# Patient Record
Sex: FEMALE | Race: WHITE | NOT HISPANIC OR LATINO
[De-identification: names, ages, dates, MRNs, and addresses within clinical notes are randomized per-mention and may not be internally consistent; named-entity substitution may affect disease eponyms.]

---

## 2017-02-22 ENCOUNTER — RX RENEWAL (OUTPATIENT)
Age: 67
End: 2017-02-22

## 2017-02-27 ENCOUNTER — APPOINTMENT (OUTPATIENT)
Dept: OPHTHALMOLOGY | Facility: CLINIC | Age: 67
End: 2017-02-27

## 2017-03-01 ENCOUNTER — RESULT REVIEW (OUTPATIENT)
Age: 67
End: 2017-03-01

## 2017-03-01 NOTE — ASU PATIENT PROFILE, ADULT - PMH
Anxiety    Corneal abnormality  history of Fuch's dystrophy  Hypercholesteremia    Migraine headache
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
## 2017-03-01 NOTE — ASU PATIENT PROFILE, ADULT - HEALTHCARE QUESTIONS, PROFILE
Questions addressed prior to procedure with Dr. Pierre and anesthesiologist Questions addressed prior to procedure with Dr. tom and anesthesiologist

## 2017-03-02 ENCOUNTER — OUTPATIENT (OUTPATIENT)
Dept: OUTPATIENT SERVICES | Facility: HOSPITAL | Age: 67
LOS: 1 days | End: 2017-03-02
Payer: COMMERCIAL

## 2017-03-02 ENCOUNTER — APPOINTMENT (OUTPATIENT)
Dept: OPHTHALMOLOGY | Facility: HOSPITAL | Age: 67
End: 2017-03-02

## 2017-03-02 ENCOUNTER — TRANSCRIPTION ENCOUNTER (OUTPATIENT)
Age: 67
End: 2017-03-02

## 2017-03-02 VITALS
DIASTOLIC BLOOD PRESSURE: 62 MMHG | SYSTOLIC BLOOD PRESSURE: 108 MMHG | RESPIRATION RATE: 19 BRPM | OXYGEN SATURATION: 10 % | HEART RATE: 69 BPM

## 2017-03-02 VITALS
OXYGEN SATURATION: 96 % | WEIGHT: 195.33 LBS | RESPIRATION RATE: 27 BRPM | DIASTOLIC BLOOD PRESSURE: 72 MMHG | HEIGHT: 62 IN | TEMPERATURE: 98 F | HEART RATE: 76 BPM | SYSTOLIC BLOOD PRESSURE: 126 MMHG

## 2017-03-02 DIAGNOSIS — Z84.89 FAMILY HISTORY OF OTHER SPECIFIED CONDITIONS: Chronic | ICD-10-CM

## 2017-03-02 DIAGNOSIS — Z98.890 OTHER SPECIFIED POSTPROCEDURAL STATES: Chronic | ICD-10-CM

## 2017-03-02 DIAGNOSIS — H25.812 COMBINED FORMS OF AGE-RELATED CATARACT, LEFT EYE: ICD-10-CM

## 2017-03-02 DIAGNOSIS — Z98.89 OTHER SPECIFIED POSTPROCEDURAL STATES: Chronic | ICD-10-CM

## 2017-03-02 DIAGNOSIS — H18.12 BULLOUS KERATOPATHY, LEFT EYE: ICD-10-CM

## 2017-03-02 DIAGNOSIS — S02.2XXA FRACTURE OF NASAL BONES, INITIAL ENCOUNTER FOR CLOSED FRACTURE: Chronic | ICD-10-CM

## 2017-03-02 PROCEDURE — 65757 PREP CORNEAL ENDO ALLOGRAFT: CPT | Mod: LT

## 2017-03-02 PROCEDURE — 88312 SPECIAL STAINS GROUP 1: CPT | Mod: 26

## 2017-03-02 PROCEDURE — 88304 TISSUE EXAM BY PATHOLOGIST: CPT

## 2017-03-02 PROCEDURE — 87070 CULTURE OTHR SPECIMN AEROBIC: CPT

## 2017-03-02 PROCEDURE — 66984 XCAPSL CTRC RMVL W/O ECP: CPT | Mod: LT

## 2017-03-02 PROCEDURE — 88312 SPECIAL STAINS GROUP 1: CPT

## 2017-03-02 PROCEDURE — 88304 TISSUE EXAM BY PATHOLOGIST: CPT | Mod: 26

## 2017-03-02 PROCEDURE — C1780: CPT

## 2017-03-02 PROCEDURE — 65756 CORNEAL TRNSPL ENDOTHELIAL: CPT | Mod: LT

## 2017-03-02 PROCEDURE — V2785: CPT

## 2017-03-02 NOTE — ASU DISCHARGE PLAN (ADULT/PEDIATRIC). - NOTIFY
Swelling that continues/Bleeding that does not stop/Persistent Nausea and Vomiting/Fever greater than 101/Pain not relieved by Medications

## 2017-03-03 ENCOUNTER — APPOINTMENT (OUTPATIENT)
Dept: OPHTHALMOLOGY | Facility: CLINIC | Age: 67
End: 2017-03-03

## 2017-03-03 LAB — GRAM STN FLD: SIGNIFICANT CHANGE UP

## 2017-03-03 RX ORDER — CYCLOPENTOLATE HYDROCHLORIDE 10 MG/ML
1 SOLUTION/ DROPS OPHTHALMIC 3 TIMES DAILY
Qty: 1 | Refills: 2 | Status: ACTIVE | COMMUNITY
Start: 2017-03-03 | End: 1900-01-01

## 2017-03-06 ENCOUNTER — APPOINTMENT (OUTPATIENT)
Dept: OPHTHALMOLOGY | Facility: CLINIC | Age: 67
End: 2017-03-06

## 2017-03-13 ENCOUNTER — APPOINTMENT (OUTPATIENT)
Dept: OPHTHALMOLOGY | Facility: CLINIC | Age: 67
End: 2017-03-13

## 2017-03-21 ENCOUNTER — APPOINTMENT (OUTPATIENT)
Dept: OPHTHALMOLOGY | Facility: CLINIC | Age: 67
End: 2017-03-21

## 2017-03-23 LAB
CULTURE RESULTS: SIGNIFICANT CHANGE UP
SPECIMEN SOURCE: SIGNIFICANT CHANGE UP

## 2017-03-27 ENCOUNTER — APPOINTMENT (OUTPATIENT)
Dept: OPHTHALMOLOGY | Facility: CLINIC | Age: 67
End: 2017-03-27

## 2017-03-29 ENCOUNTER — RX RENEWAL (OUTPATIENT)
Age: 67
End: 2017-03-29

## 2017-04-03 ENCOUNTER — RX RENEWAL (OUTPATIENT)
Age: 67
End: 2017-04-03

## 2017-05-03 ENCOUNTER — APPOINTMENT (OUTPATIENT)
Dept: OPHTHALMOLOGY | Facility: CLINIC | Age: 67
End: 2017-05-03

## 2017-05-08 ENCOUNTER — APPOINTMENT (OUTPATIENT)
Dept: OPHTHALMOLOGY | Facility: CLINIC | Age: 67
End: 2017-05-08

## 2017-05-24 ENCOUNTER — APPOINTMENT (OUTPATIENT)
Dept: OPHTHALMOLOGY | Facility: CLINIC | Age: 67
End: 2017-05-24

## 2017-05-24 DIAGNOSIS — H26.9 UNSPECIFIED CATARACT: ICD-10-CM

## 2017-06-21 ENCOUNTER — APPOINTMENT (OUTPATIENT)
Dept: OPHTHALMOLOGY | Facility: CLINIC | Age: 67
End: 2017-06-21

## 2017-07-18 ENCOUNTER — APPOINTMENT (OUTPATIENT)
Dept: OPHTHALMOLOGY | Facility: CLINIC | Age: 67
End: 2017-07-18

## 2017-07-18 DIAGNOSIS — Z96.1 PRESENCE OF INTRAOCULAR LENS: ICD-10-CM

## 2017-07-19 ENCOUNTER — APPOINTMENT (OUTPATIENT)
Dept: OPHTHALMOLOGY | Facility: CLINIC | Age: 67
End: 2017-07-19

## 2017-09-20 ENCOUNTER — APPOINTMENT (OUTPATIENT)
Dept: OPHTHALMOLOGY | Facility: CLINIC | Age: 67
End: 2017-09-20
Payer: MEDICARE

## 2017-09-20 PROCEDURE — 92012 INTRM OPH EXAM EST PATIENT: CPT

## 2017-09-21 ENCOUNTER — TRANSCRIPTION ENCOUNTER (OUTPATIENT)
Age: 67
End: 2017-09-21

## 2017-11-21 ENCOUNTER — APPOINTMENT (OUTPATIENT)
Dept: OPHTHALMOLOGY | Facility: CLINIC | Age: 67
End: 2017-11-21

## 2018-01-05 ENCOUNTER — APPOINTMENT (OUTPATIENT)
Dept: OPHTHALMOLOGY | Facility: CLINIC | Age: 68
End: 2018-01-05

## 2018-02-26 ENCOUNTER — APPOINTMENT (OUTPATIENT)
Dept: OPHTHALMOLOGY | Facility: CLINIC | Age: 68
End: 2018-02-26

## 2018-03-26 ENCOUNTER — APPOINTMENT (OUTPATIENT)
Dept: OPHTHALMOLOGY | Facility: CLINIC | Age: 68
End: 2018-03-26
Payer: MEDICARE

## 2018-03-26 DIAGNOSIS — H53.10 UNSPECIFIED SUBJECTIVE VISUAL DISTURBANCES: ICD-10-CM

## 2018-03-26 DIAGNOSIS — H26.491 OTHER SECONDARY CATARACT, RIGHT EYE: ICD-10-CM

## 2018-03-26 PROCEDURE — 92014 COMPRE OPH EXAM EST PT 1/>: CPT

## 2018-03-26 PROCEDURE — 92134 CPTRZ OPH DX IMG PST SGM RTA: CPT

## 2018-04-16 ENCOUNTER — APPOINTMENT (OUTPATIENT)
Dept: OPHTHALMOLOGY | Facility: CLINIC | Age: 68
End: 2018-04-16
Payer: MEDICARE

## 2018-04-16 DIAGNOSIS — G43.909 MIGRAINE, UNSPECIFIED, NOT INTRACTABLE, W/OUT STATUS MIGRAINOSUS: ICD-10-CM

## 2018-04-16 DIAGNOSIS — Z78.9 OTHER SPECIFIED HEALTH STATUS: ICD-10-CM

## 2018-04-16 PROCEDURE — 92083 EXTENDED VISUAL FIELD XM: CPT

## 2018-04-16 PROCEDURE — 92012 INTRM OPH EXAM EST PATIENT: CPT

## 2018-09-14 ENCOUNTER — APPOINTMENT (OUTPATIENT)
Dept: OPHTHALMOLOGY | Facility: CLINIC | Age: 68
End: 2018-09-14
Payer: MEDICARE

## 2018-09-14 PROCEDURE — 92012 INTRM OPH EXAM EST PATIENT: CPT

## 2018-09-19 ENCOUNTER — APPOINTMENT (OUTPATIENT)
Dept: OPHTHALMOLOGY | Facility: CLINIC | Age: 68
End: 2018-09-19

## 2019-03-03 ENCOUNTER — TRANSCRIPTION ENCOUNTER (OUTPATIENT)
Age: 69
End: 2019-03-03

## 2019-03-06 ENCOUNTER — APPOINTMENT (OUTPATIENT)
Dept: OPHTHALMOLOGY | Facility: CLINIC | Age: 69
End: 2019-03-06
Payer: MEDICARE

## 2019-03-06 DIAGNOSIS — H18.51 ENDOTHELIAL CORNEAL DYSTROPHY: ICD-10-CM

## 2019-03-06 DIAGNOSIS — H43.812 VITREOUS DEGENERATION, LEFT EYE: ICD-10-CM

## 2019-03-06 DIAGNOSIS — Z94.7 CORNEAL TRANSPLANT STATUS: ICD-10-CM

## 2019-03-06 PROCEDURE — 92014 COMPRE OPH EXAM EST PT 1/>: CPT

## 2019-10-28 ENCOUNTER — NON-APPOINTMENT (OUTPATIENT)
Age: 69
End: 2019-10-28

## 2019-10-28 ENCOUNTER — APPOINTMENT (OUTPATIENT)
Dept: OPHTHALMOLOGY | Facility: CLINIC | Age: 69
End: 2019-10-28
Payer: MEDICARE

## 2019-10-28 PROCEDURE — 92012 INTRM OPH EXAM EST PATIENT: CPT

## 2020-04-29 ENCOUNTER — APPOINTMENT (OUTPATIENT)
Dept: OPHTHALMOLOGY | Facility: CLINIC | Age: 70
End: 2020-04-29

## 2021-04-01 ENCOUNTER — NON-APPOINTMENT (OUTPATIENT)
Age: 71
End: 2021-04-01

## 2021-04-21 ENCOUNTER — APPOINTMENT (OUTPATIENT)
Dept: OPHTHALMOLOGY | Facility: CLINIC | Age: 71
End: 2021-04-21
Payer: MEDICARE

## 2021-04-21 ENCOUNTER — NON-APPOINTMENT (OUTPATIENT)
Age: 71
End: 2021-04-21

## 2021-04-21 PROCEDURE — 92014 COMPRE OPH EXAM EST PT 1/>: CPT

## 2021-10-25 ENCOUNTER — APPOINTMENT (OUTPATIENT)
Dept: OPHTHALMOLOGY | Facility: CLINIC | Age: 71
End: 2021-10-25
Payer: MEDICARE

## 2021-10-25 ENCOUNTER — NON-APPOINTMENT (OUTPATIENT)
Age: 71
End: 2021-10-25

## 2021-10-25 PROCEDURE — 92012 INTRM OPH EXAM EST PATIENT: CPT

## 2021-12-28 ENCOUNTER — NON-APPOINTMENT (OUTPATIENT)
Age: 71
End: 2021-12-28

## 2022-01-19 NOTE — ASU PATIENT PROFILE, ADULT - PT NEEDS ASSIST
Denis Anne (:  1952) is a 78 y.o. female,Established patient, here for evaluation of the following chief complaint(s):  Hip Pain (left going down the left leg)        Subjective   SUBJECTIVE/OBJECTIVE:  HPI:    Chief Complaint   Patient presents with    Hip Pain     left going down the left leg     Pt here for ongoing left hip pain with radiation to the left leg. Will travel all the way down into the foot. Some weakness noted. Pain also located in the left buttock. Worse in the am, improves in a few minutes to hours after waking up. Some numbness noted to lateral thigh. No recent trauma. Patient Active Problem List   Diagnosis    CAD (coronary artery disease) 2014- mid lad 50 % patent LM< LCX and RCA    Hyperlipidemia LDL goal <70    Hypothyroid    History of total thyroidectomy    GERD (gastroesophageal reflux disease)    PUD (peptic ulcer disease)    IBS (irritable bowel syndrome)    IFG (impaired fasting glucose)    Vitamin D deficiency    SOB (shortness of breath) on exertion    Chest pain    Heart palpitations- intermittent    Abnormal nuclear stress test- anteroior and anteroseptal ischemia    Chest pain, atypical    Abdominal pain    Stress incontinence (female) (male)    Abnormal cardiovascular stress test    GOLDEN (dyspnea on exertion)     Past Surgical History:   Procedure Laterality Date    CARDIAC CATHETERIZATION      CARPAL TUNNEL RELEASE      CHOLECYSTECTOMY      COLONOSCOPY      HAMMER TOE SURGERY Left     HEEL SPUR SURGERY      HYSTERECTOMY      KNEE SURGERY  2009    TOTAL THYROIDECTOMY      UPPER GASTROINTESTINAL ENDOSCOPY  2009     Social History     Tobacco Use    Smoking status: Never Smoker    Smokeless tobacco: Never Used   Vaping Use    Vaping Use: Never used   Substance Use Topics    Alcohol use: No    Drug use: No         Review of Systems   Constitutional: Negative. HENT: Negative. Respiratory: Negative. Cardiovascular: Negative. Gastrointestinal: Negative. Musculoskeletal: Positive for arthralgias, back pain and gait problem. Neurological: Positive for numbness. Negative for weakness. All other systems reviewed and are negative. Objective   Physical Exam  Vitals and nursing note reviewed. Constitutional:       General: She is not in acute distress. Appearance: Normal appearance. She is well-developed. HENT:      Head: Normocephalic and atraumatic. Right Ear: Tympanic membrane normal.      Left Ear: Tympanic membrane normal.   Eyes:      Conjunctiva/sclera: Conjunctivae normal.   Cardiovascular:      Rate and Rhythm: Normal rate and regular rhythm. Heart sounds: Normal heart sounds. No murmur heard. Pulmonary:      Effort: Pulmonary effort is normal.      Breath sounds: Normal breath sounds. No wheezing, rhonchi or rales. Abdominal:      General: There is no distension. Musculoskeletal:      Cervical back: Neck supple. Lumbar back: Tenderness present. Decreased range of motion. Back:       Left hip: Tenderness present. No bony tenderness. Decreased range of motion. Skin:     General: Skin is warm and dry. Findings: No rash (on exposed surfaces). Neurological:      General: No focal deficit present. Mental Status: She is alert. Psychiatric:         Attention and Perception: Attention normal.         Mood and Affect: Mood normal.         Speech: Speech normal.         Behavior: Behavior normal. Behavior is cooperative. Thought Content: Thought content normal.         Judgment: Judgment normal.               ASSESSMENT/PLAN:  1. Trochanteric bursitis of left hip  2.  Left lumbar radiculitis  -     methylPREDNISolone acetate (DEPO-MEDROL) injection 80 mg; 80 mg, IntraMUSCular, ONCE, On Wed 1/19/22 at 1130, For 1 dose  -     methylPREDNISolone acetate (DEPO-MEDROL) injection 40 mg; 40 mg, IntraMUSCular, ONCE, On Wed 1/19/22 at 1130, For 1 dose  -     External Referral To Physical Therapy  3. DDD (degenerative disc disease), lumbar  -     External Referral To Physical Therapy  -     XR LUMBAR SPINE (2-3 VIEWS); Future  4. Chronic left hip pain  -     External Referral To Physical Therapy  -     XR HIP LEFT (2-3 VIEWS); Future  5. Piriformis muscle pain  -     External Referral To Physical Therapy    -  Orders above    Return if symptoms worsen or fail to improve. An electronic signature was used to authenticate this note.     --Jolie Worrell, DO no

## 2022-02-17 ENCOUNTER — APPOINTMENT (OUTPATIENT)
Dept: ENDOCRINOLOGY | Facility: CLINIC | Age: 72
End: 2022-02-17
Payer: MEDICARE

## 2022-02-17 VITALS
HEIGHT: 63 IN | OXYGEN SATURATION: 91 % | BODY MASS INDEX: 40.57 KG/M2 | WEIGHT: 229 LBS | DIASTOLIC BLOOD PRESSURE: 82 MMHG | HEART RATE: 87 BPM | SYSTOLIC BLOOD PRESSURE: 140 MMHG

## 2022-02-17 DIAGNOSIS — Z78.9 OTHER SPECIFIED HEALTH STATUS: ICD-10-CM

## 2022-02-17 PROCEDURE — 99204 OFFICE O/P NEW MOD 45 MIN: CPT

## 2022-02-19 NOTE — PHYSICAL EXAM
[Alert] : alert [Obese] : obese [No Acute Distress] : no acute distress [Well Developed] : well developed [Normal Sclera/Conjunctiva] : normal sclera/conjunctiva [EOMI] : extra ocular movement intact [No Proptosis] : no proptosis [No Lid Lag] : no lid lag [Normal Hearing] : hearing was normal [No LAD] : no lymphadenopathy [Supple] : the neck was supple [Thyroid Not Enlarged] : the thyroid was not enlarged [No Respiratory Distress] : no respiratory distress [Normal Rate and Effort] : normal respiratory rate and effort [No Accessory Muscle Use] : no accessory muscle use [Clear to Auscultation] : lungs were clear to auscultation bilaterally [Normal S1, S2] : normal S1 and S2 [No Murmurs] : no murmurs [Normal Rate] : heart rate was normal [Regular Rhythm] : with a regular rhythm [No Edema] : no peripheral edema [Normal Bowel Sounds] : normal bowel sounds [Not Tender] : non-tender [Soft] : abdomen soft [No Stigmata of Cushings Syndrome] : no stigmata of Cushings Syndrome [Normal Gait] : normal gait [No Clubbing, Cyanosis] : no clubbing  or cyanosis of the fingernails [No Involuntary Movements] : no involuntary movements were seen [No Rash] : no rash [Normal Reflexes] : deep tendon reflexes were 2+ and symmetric [No Tremors] : no tremors [Oriented x3] : oriented to person, place, and time [Normal Insight/Judgement] : insight and judgment were intact [Abdominal Striae] : no abdominal striae [Acanthosis Nigricans] : no acanthosis nigricans [Hirsutism] : no hirsutism

## 2022-02-19 NOTE — CONSULT LETTER
[Dear  ___] : Dear  [unfilled], [Consult Letter:] : I had the pleasure of evaluating your patient, [unfilled]. [Consult Closing:] : Thank you very much for allowing me to participate in the care of this patient.  If you have any questions, please do not hesitate to contact me. [Please see my note below.] : Please see my note below. [Sincerely,] : Sincerely, [FreeTextEntry3] : Morelia Best, DO\par Endocrinologist \par [facility]\par

## 2022-02-19 NOTE — ASSESSMENT
[FreeTextEntry1] : 1. h/o multinodular goiter\par h/o previous FNA of thyroid nodule in 2003 and 2006, previous pathology reportedly benign, however no records to review today\par Repeat FNA of left midpole 3.9cm nodule in November 2021 noted report of pathology AUS ([place] category III), moelcular testing was "very low risk of malignancy".\par Explained clinical significance of this report, will opt to monitor thyroid nodule closely and consider repeat FNA with repeat sonogram in 6 months.\par Advised patient to obtain reports of previous FNA pathology and send to this office.\par Clinically euthyroid, not on thyroid medication currently. Previous TFTs noted within normal range.\par Will repeat TFTs at next visit\par \par Answered all questions today; patient verbalized understanding of the above.\par RTC in 3 months.

## 2022-02-19 NOTE — HISTORY OF PRESENT ILLNESS
[FreeTextEntry1] : MINISTERIO ALARCON  is a 70 yo female with past medical history of multinodular goiter, Fuchs corneal dystrophy, anxiety and depression who presents to clinic today for management of thyroid nodule.\par \par She has history of multinodular goiter, she notes she had 2 FNAs previously  in 2003, 2006 of reportedly the same thyroid nodule and pathology was benign, had noted repeat FNA in Nov 2021 due to change in size of thyroid nodule.She is not currently on thyroid medication.  \par \par Patient denies heat or cold intolerance, dyspnea, dysphagia, dysphonia, changes in bowel habits including diarrhea or constipation or any recent change in weight.  Menopausal after hysterectomy in 2014.  She also denies anxiety, palpitations, tremors/shaking of extremities or insomnia.\par \par PMH:as above\par PSH: b/l corneal transplant, L knee ACL, right knee meniscus, hysterectomy\par Family Hx: No h/o thyroid disease, Father- DM \par Social Hx: social ETOH, tobacco or illicit drug\par ALL: NKDA\par Home Meds: vitamin D2 50,000IU po weekly, Cetirizine 10mg daily, Ezetimibe 10mg daily, Atorvastatin 80mg daily, Sertraline 200mg daily, Bupropion HCl 150mg daily, Lorazepam 0.5mg as needed, Mirtazapine 15mg daily

## 2022-04-29 ENCOUNTER — APPOINTMENT (OUTPATIENT)
Dept: OPHTHALMOLOGY | Facility: CLINIC | Age: 72
End: 2022-04-29
Payer: MEDICARE

## 2022-04-29 ENCOUNTER — NON-APPOINTMENT (OUTPATIENT)
Age: 72
End: 2022-04-29

## 2022-04-29 PROCEDURE — 92014 COMPRE OPH EXAM EST PT 1/>: CPT

## 2022-07-30 ENCOUNTER — NON-APPOINTMENT (OUTPATIENT)
Age: 72
End: 2022-07-30

## 2022-08-04 ENCOUNTER — APPOINTMENT (OUTPATIENT)
Dept: ENDOCRINOLOGY | Facility: CLINIC | Age: 72
End: 2022-08-04

## 2022-08-04 VITALS
BODY MASS INDEX: 41.11 KG/M2 | DIASTOLIC BLOOD PRESSURE: 80 MMHG | WEIGHT: 232 LBS | HEART RATE: 77 BPM | HEIGHT: 63 IN | OXYGEN SATURATION: 90 % | SYSTOLIC BLOOD PRESSURE: 130 MMHG

## 2022-08-04 PROCEDURE — 99213 OFFICE O/P EST LOW 20 MIN: CPT

## 2022-08-04 NOTE — PHYSICAL EXAM
[Alert] : alert [No Acute Distress] : no acute distress [Well Developed] : well developed [Normal Sclera/Conjunctiva] : normal sclera/conjunctiva [EOMI] : extra ocular movement intact [No Proptosis] : no proptosis [No Lid Lag] : no lid lag [Normal Hearing] : hearing was normal [No LAD] : no lymphadenopathy [Supple] : the neck was supple [Thyroid Not Enlarged] : the thyroid was not enlarged [No Thyroid Nodules] : no palpable thyroid nodules [No Respiratory Distress] : no respiratory distress [No Accessory Muscle Use] : no accessory muscle use [Normal Rate and Effort] : normal respiratory rate and effort [Clear to Auscultation] : lungs were clear to auscultation bilaterally [Normal S1, S2] : normal S1 and S2 [No Murmurs] : no murmurs [Normal Rate] : heart rate was normal [Regular Rhythm] : with a regular rhythm [No Edema] : no peripheral edema [Normal Bowel Sounds] : normal bowel sounds [Not Tender] : non-tender [Not Distended] : not distended [Soft] : abdomen soft [No Stigmata of Cushings Syndrome] : no stigmata of Cushings Syndrome [Normal Gait] : normal gait [No Clubbing, Cyanosis] : no clubbing  or cyanosis of the fingernails [No Involuntary Movements] : no involuntary movements were seen [Normal Reflexes] : deep tendon reflexes were 2+ and symmetric [No Tremors] : no tremors [Oriented x3] : oriented to person, place, and time [Normal Insight/Judgement] : insight and judgment were intact

## 2022-08-09 NOTE — ASSESSMENT
[FreeTextEntry1] : 1. h/o multinodular goiter\par h/o previous FNA of thyroid nodule in 2003 and 2006, previous pathology reportedly benign, however no records to review \par Repeat FNA of left midpole 3.9cm nodule in November 2021 noted report of pathology AUS ([place] category III), molecular testing noted "very low risk of malignancy".\par Patient opted to repeat sonogram however forget to to do today.\par Denies compressive symptoms today\par Advised patient to obtain reports of previous FNA pathology and send to this office.\par Clinically euthyroid, not on thyroid medication currently. Last TSH from July 2022 is 1.26, within normal range.\par \par \par Answered all questions today; patient verbalized understanding of the above\par RTC once thyroid sonogram completed

## 2022-08-09 NOTE — HISTORY OF PRESENT ILLNESS
[FreeTextEntry1] : This is a 72 yo female with past medical history of multinodular goiter, Fuchs corneal dystrophy, anxiety and depression who presents for follow up of thyroid nodule.\par \par She was seen at last endocrine visit in April 2022 at which time repeat FNA of left midpole nodule in Nov 2021 noted AUS, molecular testing noted "low risk of malignancy" and discussed to either to repeat FNA/thyroid sonogram. She also had 2 FNAs previously in 2003, 2006 of reportedly the same thyroid nodule and pathology was benign, had noted repeat FNA in Nov 2021 due to change in size of thyroid nodule.She is not currently on thyroid medication. \par Patient opted to repeat sonogram. She is not on thyroid medication\par \par Today she is doing well and has no complaints, however she notes she did not repeat thyroid sonogram. She notes she recently getting workup for breast lesion, she is currently scheduled for lumpectomy next week, she notes this was done as a "prophylactic surgery" by breast surgeon Dr Holland from [facility].

## 2022-10-24 ENCOUNTER — APPOINTMENT (OUTPATIENT)
Dept: OPHTHALMOLOGY | Facility: CLINIC | Age: 72
End: 2022-10-24

## 2022-11-25 ENCOUNTER — NON-APPOINTMENT (OUTPATIENT)
Age: 72
End: 2022-11-25

## 2022-11-25 ENCOUNTER — APPOINTMENT (OUTPATIENT)
Dept: OPHTHALMOLOGY | Facility: CLINIC | Age: 72
End: 2022-11-25

## 2022-11-25 PROCEDURE — 92014 COMPRE OPH EXAM EST PT 1/>: CPT | Mod: 25

## 2022-12-12 ENCOUNTER — NON-APPOINTMENT (OUTPATIENT)
Age: 72
End: 2022-12-12

## 2022-12-20 ENCOUNTER — APPOINTMENT (OUTPATIENT)
Dept: ENDOCRINOLOGY | Facility: CLINIC | Age: 72
End: 2022-12-20

## 2022-12-20 VITALS
WEIGHT: 240 LBS | BODY MASS INDEX: 42.52 KG/M2 | SYSTOLIC BLOOD PRESSURE: 130 MMHG | DIASTOLIC BLOOD PRESSURE: 78 MMHG | HEIGHT: 63 IN

## 2022-12-20 PROCEDURE — 36415 COLL VENOUS BLD VENIPUNCTURE: CPT

## 2022-12-20 PROCEDURE — 99212 OFFICE O/P EST SF 10 MIN: CPT | Mod: 25

## 2022-12-27 ENCOUNTER — NON-APPOINTMENT (OUTPATIENT)
Age: 72
End: 2022-12-27

## 2022-12-27 LAB
T4 FREE SERPL-MCNC: 0.8 NG/DL
TSH SERPL-ACNC: 1.39 UIU/ML

## 2022-12-27 NOTE — HISTORY OF PRESENT ILLNESS
[FreeTextEntry1] : This is a 73 yo female with past medical history of multinodular goiter, Fuchs corneal dystrophy, anxiety and depression who presents for follow up of thyroid nodule.\par \par She was seen at last endocrine visit in April 2022 at which time repeat FNA of left midpole nodule in Nov 2021 noted AUS, molecular testing noted "low risk of malignancy" and discussed to either to repeat FNA/thyroid sonogram. She also had 2 FNAs previously in 2003, 2006 of reportedly the same thyroid nodule and pathology was benign, had noted repeat FNA in Nov 2021 due to change in size of thyroid nodule.She is not currently on thyroid medication. Patient opted to repeat thyroid sonogram. \par \par Today she is doing well and has no complaints, \par She is on Evista for 5 years for breast chemoprevention and osteoporosis. She is following with breast surgeon Dr Holland from [facility]. 
No

## 2022-12-27 NOTE — ASSESSMENT
[FreeTextEntry1] : 1. h/o multinodular goiter\par h/o previous FNA of thyroid nodule in 2003 and 2006, previous pathology reportedly benign, however no records to review \par Repeat FNA of left midpole 3.9cm nodule in November 2021 noted report of pathology AUS ([place] category III), molecular testing noted "very low risk of malignancy".\par Discussed thyroid sonogram from Dec 2022 with patient today, noted decrease in size of left midpole nodule, will continue to monitor. Patient denies compressive symptoms today\par Patient will repeat thyroid sonogram next year\par Advised patient to obtain reports of previous FNA pathology and send to this office.\par Clinically euthyroid, not on thyroid medication currently\par Bloodwork was collected in office today, will f/u results accordingly. \par \par Answered all questions today; patient verbalized understanding of the above\par RTCin 6 months

## 2023-01-07 ENCOUNTER — APPOINTMENT (OUTPATIENT)
Dept: ORTHOPEDIC SURGERY | Facility: CLINIC | Age: 73
End: 2023-01-07

## 2023-05-24 ENCOUNTER — APPOINTMENT (OUTPATIENT)
Dept: OPHTHALMOLOGY | Facility: CLINIC | Age: 73
End: 2023-05-24
Payer: MEDICARE

## 2023-05-24 ENCOUNTER — NON-APPOINTMENT (OUTPATIENT)
Age: 73
End: 2023-05-24

## 2023-05-24 PROCEDURE — 92012 INTRM OPH EXAM EST PATIENT: CPT

## 2023-05-26 ENCOUNTER — APPOINTMENT (OUTPATIENT)
Dept: ORTHOPEDIC SURGERY | Facility: CLINIC | Age: 73
End: 2023-05-26
Payer: MEDICARE

## 2023-05-26 VITALS — HEIGHT: 63 IN | BODY MASS INDEX: 42.52 KG/M2 | WEIGHT: 240 LBS

## 2023-05-26 DIAGNOSIS — M17.11 UNILATERAL PRIMARY OSTEOARTHRITIS, RIGHT KNEE: ICD-10-CM

## 2023-05-26 DIAGNOSIS — M17.12 UNILATERAL PRIMARY OSTEOARTHRITIS, LEFT KNEE: ICD-10-CM

## 2023-05-26 PROCEDURE — 20610 DRAIN/INJ JOINT/BURSA W/O US: CPT | Mod: 50

## 2023-05-26 PROCEDURE — J3490M: CUSTOM | Mod: NC

## 2023-05-26 PROCEDURE — 73564 X-RAY EXAM KNEE 4 OR MORE: CPT | Mod: 50

## 2023-05-26 PROCEDURE — 99213 OFFICE O/P EST LOW 20 MIN: CPT | Mod: 25

## 2023-05-26 RX ORDER — BUPRENORPHINE HCL 150 MCG
150 FILM, MEDICATED (EA) BUCCAL
Refills: 0 | Status: ACTIVE | COMMUNITY

## 2023-05-26 RX ORDER — RALOXIFENE HYDROCHLORIDE 60 MG/1
60 TABLET, FILM COATED ORAL
Refills: 0 | Status: ACTIVE | COMMUNITY

## 2023-05-26 RX ORDER — ATORVASTATIN CALCIUM 80 MG/1
80 TABLET, FILM COATED ORAL
Refills: 0 | Status: ACTIVE | COMMUNITY

## 2023-05-26 NOTE — DISCUSSION/SUMMARY
[de-identified] : 72f with b/l knee djd\par 1) csi b/l knees today - tolerated well\par 2) discussed availability of visco injections\par 3) cryotherapy, rest and activity modification\par 4) hep \par \par Entered by Keila Leggett acting as scribe.\par Dr. Hawley-\par The documentation recorded by the scribe accurately reflects the service I personally performed and the decisions made by me.

## 2023-05-26 NOTE — HISTORY OF PRESENT ILLNESS
[10] : 10 [4] : 4 [Dull/Aching] : dull/aching [Constant] : constant [Leisure] : leisure [Sleep] : sleep [Nothing helps with pain getting better] : Nothing helps with pain getting better [Standing] : standing [Walking] : walking [de-identified] : 05/26/2023 Ms. MINISTERIO ALARCON, a 72 year old female, presents today for b/l knees. Hx of right knee pmm/plm 2021, hx of orthovisc injections. Reports that she has been experiencing intermittent b/l knee pain, greatly aggravated after a recent trip to [place].  [] : no [FreeTextEntry1] :  B/L Knees [FreeTextEntry5] : Pt complaining of ongoing  B/L Knee pain for a couple months.  Walking with  limp. ROM normal.  B/L Knees No swelling but feels tender.

## 2023-05-26 NOTE — PROCEDURE
[FreeTextEntry3] : Large joint injection was performed of the b/l knees. An injection of Lidocaine 3cc of 1% , Bupivacaine (Marcaine) 6cc of 0.5% , Triamcinolone (Kenalog) 2cc of 40 mg  was used. \par Patient was advised to call if redness, pain or fever occur and apply ice for 15 minutes out of every hour for the next 12-24 hours as tolerated. \par \par Patient has tried OTC's including aspirin, Ibuprofen, Aleve, etc or prescription NSAIDS, and/or exercises at home and/or physical therapy without satisfactory response, patient had decreased mobility in the joint and the risks benefits, and alternatives have been discussed, and verbal consent was obtained. \par The site was prepped with alcohol, betadine and ethyl chloride sprayed topically\par \par The risks, benefits and contents of the injection have been discussed.  Risks include but are not limited to allergic reaction, flare reaction, permanent white skin discoloration at the injection site and infection.  The patient understands the risks and agrees to having the injection.  All questions have been answered.

## 2023-05-30 ENCOUNTER — APPOINTMENT (OUTPATIENT)
Dept: ENDOCRINOLOGY | Facility: CLINIC | Age: 73
End: 2023-05-30

## 2023-06-13 ENCOUNTER — APPOINTMENT (OUTPATIENT)
Dept: ENDOCRINOLOGY | Facility: CLINIC | Age: 73
End: 2023-06-13
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
## 2023-11-24 ENCOUNTER — APPOINTMENT (OUTPATIENT)
Dept: OPHTHALMOLOGY | Facility: CLINIC | Age: 73
End: 2023-11-24

## 2023-11-28 ENCOUNTER — APPOINTMENT (OUTPATIENT)
Dept: ENDOCRINOLOGY | Facility: CLINIC | Age: 73
End: 2023-11-28
Payer: MEDICARE

## 2023-11-28 VITALS
BODY MASS INDEX: 39.73 KG/M2 | HEIGHT: 63 IN | DIASTOLIC BLOOD PRESSURE: 77 MMHG | OXYGEN SATURATION: 97 % | HEART RATE: 82 BPM | SYSTOLIC BLOOD PRESSURE: 124 MMHG | WEIGHT: 224.25 LBS

## 2023-11-28 DIAGNOSIS — E04.1 NONTOXIC SINGLE THYROID NODULE: ICD-10-CM

## 2023-11-28 PROCEDURE — 99213 OFFICE O/P EST LOW 20 MIN: CPT | Mod: 25

## 2023-11-28 PROCEDURE — 36415 COLL VENOUS BLD VENIPUNCTURE: CPT

## 2023-12-01 LAB
T4 FREE SERPL-MCNC: 0.8 NG/DL
TSH SERPL-ACNC: 2.4 UIU/ML

## 2023-12-01 RX ORDER — LORAZEPAM 0.5 MG/1
0.5 TABLET ORAL
Qty: 60 | Refills: 0 | Status: ACTIVE | COMMUNITY
Start: 2023-11-16

## 2023-12-01 RX ORDER — NYSTATIN AND TRIAMCINOLONE ACETONIDE 100000; 1 MG/G; MG/G
100000-0.1 CREAM TOPICAL
Qty: 30 | Refills: 0 | Status: ACTIVE | COMMUNITY
Start: 2023-09-15

## 2023-12-01 RX ORDER — SERTRALINE HYDROCHLORIDE 100 MG/1
100 TABLET, FILM COATED ORAL
Qty: 180 | Refills: 0 | Status: ACTIVE | COMMUNITY
Start: 2023-11-16

## 2023-12-01 RX ORDER — BUPROPION HYDROCHLORIDE 150 MG/1
150 TABLET, EXTENDED RELEASE ORAL
Qty: 90 | Refills: 0 | Status: ACTIVE | COMMUNITY
Start: 2023-08-15

## 2023-12-01 RX ORDER — FLUOROMETHOLONE 1 MG/ML
0.1 SOLUTION/ DROPS OPHTHALMIC
Qty: 10 | Refills: 0 | Status: ACTIVE | COMMUNITY
Start: 2023-05-24

## 2023-12-01 RX ORDER — OMEPRAZOLE 40 MG/1
40 CAPSULE, DELAYED RELEASE ORAL
Qty: 30 | Refills: 0 | Status: ACTIVE | COMMUNITY
Start: 2023-06-13

## 2023-12-01 RX ORDER — QUETIAPINE FUMARATE 100 MG/1
100 TABLET ORAL
Qty: 90 | Refills: 0 | Status: ACTIVE | COMMUNITY
Start: 2023-11-16

## 2023-12-01 RX ORDER — ERGOCALCIFEROL 1.25 MG/1
1.25 MG CAPSULE, LIQUID FILLED ORAL
Qty: 12 | Refills: 0 | Status: ACTIVE | COMMUNITY
Start: 2023-07-26

## 2023-12-01 RX ORDER — QUETIAPINE FUMARATE 50 MG/1
50 TABLET ORAL
Qty: 90 | Refills: 0 | Status: ACTIVE | COMMUNITY
Start: 2023-11-16

## 2024-01-03 ENCOUNTER — NON-APPOINTMENT (OUTPATIENT)
Age: 74
End: 2024-01-03

## 2024-03-04 ENCOUNTER — APPOINTMENT (OUTPATIENT)
Dept: OPHTHALMOLOGY | Facility: CLINIC | Age: 74
End: 2024-03-04

## 2024-06-21 ENCOUNTER — APPOINTMENT (OUTPATIENT)
Dept: OPHTHALMOLOGY | Facility: CLINIC | Age: 74
End: 2024-06-21
Payer: MEDICARE

## 2024-06-21 ENCOUNTER — NON-APPOINTMENT (OUTPATIENT)
Age: 74
End: 2024-06-21

## 2024-06-21 PROCEDURE — 92014 COMPRE OPH EXAM EST PT 1/>: CPT

## 2024-10-10 ENCOUNTER — APPOINTMENT (OUTPATIENT)
Dept: ORTHOPEDIC SURGERY | Facility: CLINIC | Age: 74
End: 2024-10-10
Payer: MEDICARE

## 2024-10-10 VITALS — HEIGHT: 63 IN | WEIGHT: 224 LBS | BODY MASS INDEX: 39.69 KG/M2

## 2024-10-10 PROCEDURE — 73564 X-RAY EXAM KNEE 4 OR MORE: CPT | Mod: 50

## 2024-10-10 PROCEDURE — 99214 OFFICE O/P EST MOD 30 MIN: CPT

## 2024-10-10 RX ORDER — EZETIMIBE 10 MG/1
TABLET ORAL
Refills: 0 | Status: ACTIVE | COMMUNITY

## 2024-10-10 RX ORDER — TRAZODONE HYDROCHLORIDE 50 MG/1
50 TABLET ORAL
Refills: 0 | Status: ACTIVE | COMMUNITY

## 2024-10-18 ENCOUNTER — APPOINTMENT (OUTPATIENT)
Dept: ORTHOPEDIC SURGERY | Facility: CLINIC | Age: 74
End: 2024-10-18
Payer: MEDICARE

## 2024-10-18 VITALS — BODY MASS INDEX: 38.62 KG/M2 | WEIGHT: 218 LBS | HEIGHT: 63 IN

## 2024-10-18 DIAGNOSIS — M17.12 UNILATERAL PRIMARY OSTEOARTHRITIS, LEFT KNEE: ICD-10-CM

## 2024-10-18 DIAGNOSIS — M17.11 UNILATERAL PRIMARY OSTEOARTHRITIS, RIGHT KNEE: ICD-10-CM

## 2024-10-18 PROCEDURE — 99215 OFFICE O/P EST HI 40 MIN: CPT

## 2024-10-21 ENCOUNTER — NON-APPOINTMENT (OUTPATIENT)
Age: 74
End: 2024-10-21

## 2024-10-24 ENCOUNTER — APPOINTMENT (OUTPATIENT)
Dept: CT IMAGING | Facility: CLINIC | Age: 74
End: 2024-10-24

## 2024-10-24 PROCEDURE — 73700 CT LOWER EXTREMITY W/O DYE: CPT | Mod: RT,MH

## 2024-10-28 ENCOUNTER — NON-APPOINTMENT (OUTPATIENT)
Age: 74
End: 2024-10-28

## 2024-11-01 ENCOUNTER — OUTPATIENT (OUTPATIENT)
Dept: OUTPATIENT SERVICES | Facility: HOSPITAL | Age: 74
LOS: 1 days | Discharge: HOME | End: 2024-11-01
Payer: MEDICARE

## 2024-11-01 VITALS
WEIGHT: 219.14 LBS | RESPIRATION RATE: 18 BRPM | HEIGHT: 63 IN | TEMPERATURE: 98 F | OXYGEN SATURATION: 95 % | DIASTOLIC BLOOD PRESSURE: 71 MMHG | HEART RATE: 84 BPM | SYSTOLIC BLOOD PRESSURE: 115 MMHG

## 2024-11-01 DIAGNOSIS — Z98.89 OTHER SPECIFIED POSTPROCEDURAL STATES: Chronic | ICD-10-CM

## 2024-11-01 DIAGNOSIS — F41.9 ANXIETY DISORDER, UNSPECIFIED: ICD-10-CM

## 2024-11-01 DIAGNOSIS — E78.0 PURE HYPERCHOLESTEROLEMIA: ICD-10-CM

## 2024-11-01 DIAGNOSIS — S02.2XXA FRACTURE OF NASAL BONES, INITIAL ENCOUNTER FOR CLOSED FRACTURE: Chronic | ICD-10-CM

## 2024-11-01 DIAGNOSIS — Z98.890 OTHER SPECIFIED POSTPROCEDURAL STATES: Chronic | ICD-10-CM

## 2024-11-01 DIAGNOSIS — M17.11 UNILATERAL PRIMARY OSTEOARTHRITIS, RIGHT KNEE: ICD-10-CM

## 2024-11-01 DIAGNOSIS — Z84.89 FAMILY HISTORY OF OTHER SPECIFIED CONDITIONS: Chronic | ICD-10-CM

## 2024-11-01 DIAGNOSIS — Z01.818 ENCOUNTER FOR OTHER PREPROCEDURAL EXAMINATION: ICD-10-CM

## 2024-11-01 DIAGNOSIS — K21.9 GASTRO-ESOPHAGEAL REFLUX DISEASE WITHOUT ESOPHAGITIS: ICD-10-CM

## 2024-11-01 DIAGNOSIS — Z94.7 CORNEAL TRANSPLANT STATUS: Chronic | ICD-10-CM

## 2024-11-01 LAB
ALBUMIN SERPL ELPH-MCNC: 3.6 G/DL — SIGNIFICANT CHANGE UP (ref 3.3–5)
ALP SERPL-CCNC: 74 U/L — SIGNIFICANT CHANGE UP (ref 40–120)
ALT FLD-CCNC: 22 U/L — SIGNIFICANT CHANGE UP (ref 12–78)
ANION GAP SERPL CALC-SCNC: 7 MMOL/L — SIGNIFICANT CHANGE UP (ref 5–17)
ANISOCYTOSIS BLD QL: SIGNIFICANT CHANGE UP
APTT BLD: 28.6 SEC — SIGNIFICANT CHANGE UP (ref 24.5–35.6)
AST SERPL-CCNC: 20 U/L — SIGNIFICANT CHANGE UP (ref 15–37)
BASOPHILS # BLD AUTO: 0.09 K/UL — SIGNIFICANT CHANGE UP (ref 0–0.2)
BASOPHILS NFR BLD AUTO: 1.2 % — SIGNIFICANT CHANGE UP (ref 0–2)
BILIRUB SERPL-MCNC: 0.5 MG/DL — SIGNIFICANT CHANGE UP (ref 0.2–1.2)
BLD GP AB SCN SERPL QL: SIGNIFICANT CHANGE UP
BUN SERPL-MCNC: 15 MG/DL — SIGNIFICANT CHANGE UP (ref 7–23)
CALCIUM SERPL-MCNC: 8.9 MG/DL — SIGNIFICANT CHANGE UP (ref 8.5–10.1)
CHLORIDE SERPL-SCNC: 104 MMOL/L — SIGNIFICANT CHANGE UP (ref 96–108)
CO2 SERPL-SCNC: 29 MMOL/L — SIGNIFICANT CHANGE UP (ref 22–31)
CREAT SERPL-MCNC: 0.91 MG/DL — SIGNIFICANT CHANGE UP (ref 0.5–1.3)
DACRYOCYTES BLD QL SMEAR: SLIGHT — SIGNIFICANT CHANGE UP
EGFR: 67 ML/MIN/1.73M2 — SIGNIFICANT CHANGE UP
ELLIPTOCYTES BLD QL SMEAR: SLIGHT — SIGNIFICANT CHANGE UP
EOSINOPHIL # BLD AUTO: 0.31 K/UL — SIGNIFICANT CHANGE UP (ref 0–0.5)
EOSINOPHIL NFR BLD AUTO: 4.3 % — SIGNIFICANT CHANGE UP (ref 0–6)
GLUCOSE SERPL-MCNC: 103 MG/DL — HIGH (ref 70–99)
HCT VFR BLD CALC: 35.7 % — SIGNIFICANT CHANGE UP (ref 34.5–45)
HGB BLD-MCNC: 10.8 G/DL — LOW (ref 11.5–15.5)
IMM GRANULOCYTES NFR BLD AUTO: 0.3 % — SIGNIFICANT CHANGE UP (ref 0–0.9)
INR BLD: 1.12 RATIO — SIGNIFICANT CHANGE UP (ref 0.85–1.16)
LYMPHOCYTES # BLD AUTO: 2.3 K/UL — SIGNIFICANT CHANGE UP (ref 1–3.3)
LYMPHOCYTES # BLD AUTO: 31.7 % — SIGNIFICANT CHANGE UP (ref 13–44)
MACROCYTES BLD QL: SLIGHT — SIGNIFICANT CHANGE UP
MANUAL SMEAR VERIFICATION: SIGNIFICANT CHANGE UP
MCHC RBC-ENTMCNC: 18.3 PG — LOW (ref 27–34)
MCHC RBC-ENTMCNC: 30.3 G/DL — LOW (ref 32–36)
MCV RBC AUTO: 60.6 FL — LOW (ref 80–100)
MICROCYTES BLD QL: SLIGHT — SIGNIFICANT CHANGE UP
MONOCYTES # BLD AUTO: 0.55 K/UL — SIGNIFICANT CHANGE UP (ref 0–0.9)
MONOCYTES NFR BLD AUTO: 7.6 % — SIGNIFICANT CHANGE UP (ref 2–14)
NEUTROPHILS # BLD AUTO: 3.99 K/UL — SIGNIFICANT CHANGE UP (ref 1.8–7.4)
NEUTROPHILS NFR BLD AUTO: 54.9 % — SIGNIFICANT CHANGE UP (ref 43–77)
NRBC # BLD: 0 /100 WBCS — SIGNIFICANT CHANGE UP (ref 0–0)
PLAT MORPH BLD: NORMAL — SIGNIFICANT CHANGE UP
PLATELET # BLD AUTO: 269 K/UL — SIGNIFICANT CHANGE UP (ref 150–400)
POIKILOCYTOSIS BLD QL AUTO: SLIGHT — SIGNIFICANT CHANGE UP
POTASSIUM SERPL-MCNC: 3.9 MMOL/L — SIGNIFICANT CHANGE UP (ref 3.5–5.3)
POTASSIUM SERPL-SCNC: 3.9 MMOL/L — SIGNIFICANT CHANGE UP (ref 3.5–5.3)
PROT SERPL-MCNC: 7.2 GM/DL — SIGNIFICANT CHANGE UP (ref 6–8.3)
PROTHROM AB SERPL-ACNC: 13 SEC — SIGNIFICANT CHANGE UP (ref 9.9–13.4)
RBC # BLD: 5.89 M/UL — HIGH (ref 3.8–5.2)
RBC # FLD: 19.3 % — HIGH (ref 10.3–14.5)
RBC BLD AUTO: ABNORMAL
SODIUM SERPL-SCNC: 140 MMOL/L — SIGNIFICANT CHANGE UP (ref 135–145)
TARGETS BLD QL SMEAR: SLIGHT — SIGNIFICANT CHANGE UP
WBC # BLD: 7.26 K/UL — SIGNIFICANT CHANGE UP (ref 3.8–10.5)
WBC # FLD AUTO: 7.26 K/UL — SIGNIFICANT CHANGE UP (ref 3.8–10.5)

## 2024-11-01 PROCEDURE — 93010 ELECTROCARDIOGRAM REPORT: CPT

## 2024-11-01 NOTE — H&P PST ADULT - HISTORY OF COVID-19 VACCINATION
Patient accompanied by mom.  They may be reached at [phone] Home, Cell.  Ok to leave detailed message.     Yes

## 2024-11-01 NOTE — PHYSICAL THERAPY INITIAL EVALUATION ADULT - NSPTDMEREC_GEN_A_CORE
rolling walker/toileting The patient has a mobility limitation that significantly impairs their ability to participate independently in mobility-related activities of daily living (MRADLs) in the home. This functional deficit can be sufficiently resolved with the use of 2-wheeled rolling walker./rolling walker/toileting The patient has a mobility limitation that significantly impairs their ability to participate independently in mobility-related activities of daily living (MRADLs) in the home. This functional deficit can be sufficiently resolved with the use of 2-wheeled rolling walker./rolling walker/straight cane/toileting

## 2024-11-01 NOTE — H&P PST ADULT - NSICDXFAMILYHX_GEN_ALL_CORE_FT
Anibal Foley (Resident)
FAMILY HISTORY:  Father  Still living? Unknown  FH: heart disease, Age at diagnosis: Age Unknown

## 2024-11-01 NOTE — H&P PST ADULT - ASSESSMENT
73 y/o female with gerd, hx of stomach ulcer, precancerous breast mass, hx of b/l corneal transplants hx of lumbar laminectomy/discectomy () here for presurgical examination for planned Robotic assisted right total knee arthroplasty with Dr. Marques on 2024. Denies recent travels in the past 30 days. No fever, SOB, cough, flu like symptoms or body rash- covid screen.  Goal: To walk pain free    CAPRINI SCORE    AGE RELATED RISK FACTORS                                                             [ ] Age 41-60 years                                            (1 Point)  [ x] Age: 61-74 years                                           (2 Points)                 [ ] Age= 75 years                                                (3 Points)             DISEASE RELATED RISK FACTORS                                                       [ ] Edema in the lower extremities                 (1 Point)                     [ ] Varicose veins                                               (1 Point)                                 [x] BMI > 25 Kg/m2                                            (1 Point)                                  [ ] Serious infection (ie PNA)                            (1 Point)                     [ ] Lung disease ( COPD, Emphysema)            (1 Point)                                                                          [ ] Acute myocardial infarction                         (1 Point)                  [ ] Congestive heart failure (in the previous month)  (1 Point)         [ ] Inflammatory bowel disease                            (1 Point)                  [ ] Central venous access, PICC or Port               (2 points)       (within the last month)                                                                [ ] Stroke (in the previous month)                        (5 Points)    [x Previous or present malignancy                       (2 points)                                                                                                                                                         HEMATOLOGY RELATED FACTORS                                                         [ ] Prior episodes of VTE                                     (3 Points)                     [ ] Positive family history for VTE                      (3 Points)                  [ ] Prothrombin 43779 A                                     (3 Points)                     [ ] Factor V Leiden                                                (3 Points)                        [ ] Lupus anticoagulants                                      (3 Points)                                                           [ ] Anticardiolipin antibodies                              (3 Points)                                                       [ ] High homocysteine in the blood                   (3 Points)                                             [ ] Other congenital or acquired thrombophilia      (3 Points)                                                [ ] Heparin induced thrombocytopenia                  (3 Points)                                        MOBILITY RELATED FACTORS  [ ] Bed rest                                                         (1 Point)  [ ] Plaster cast                                                    (2 points)  [ ] Bed bound for more than 72 hours           (2 Points)    GENDER SPECIFIC FACTORS  [ ] Pregnancy or had a baby within the last month   (1 Point)  [ ] Post-partum < 6 weeks                                   (1 Point)  [ ] Hormonal therapy  or oral contraception   (1 Point)  [ ] History of pregnancy complications              (1 point)  [ ] Unexplained or recurrent              (1 Point)    OTHER RISK FACTORS                                           (1 Point)  [ ] BMI >40, smoking, diabetes requiring insulin, chemotherapy  blood transfusions and length of surgery over 2 hours    SURGERY RELATED RISK FACTORS  [ ]  Section within the last month     (1 Point)  [ ] Minor surgery                                                  (1 Point)  [ ] Arthroscopic surgery                                       (2 Points)  [ ] Planned major surgery lasting more            (2 Points)      than 45 minutes     [ x] Elective hip or knee joint replacement       (5 points)       surgery                                                TRAUMA RELATED RISK FACTORS  [ ] Fracture of the hip, pelvis, or leg                       (5 Points)  [ ] Spinal cord injury resulting in paralysis             (5 points)       (in the previous month)    [ ] Paralysis  (less than 1 month)                             (5 Points)  [ ] Multiple Trauma within 1 month                        (5 Points)    Total Score [  10      ]    Caprini Score 0-2: Low Risk, NO VTE prophylaxis required for most patients, encourage ambulation  Caprini Score 3-6: Moderate Risk , pharmacologic VTE prophylaxis is indicated for most patients (in the absence of contraindications)  Caprini Score Greater than or =7: High risk, pharmocologic VTE prophylaxis indicated for most patients (in the absence of contraindications)

## 2024-11-01 NOTE — H&P PST ADULT - LAST STRESS TEST
[facility] MEDICINE PROGRESS NOTE   Patient: Jeannette Bumpers  Today's Date: 11/3/2023    YOB: 1948  Admission Date: 11/1/2023    MRN: [id number]  Inpatient LOS: 2    Room: 113/01  Hospital Day: Hospital Day: 3      HISTORY AND SUBJECTIVE COMPLAINTS     Chief Complaint:   No chief complaint on file.      Interval History / Subjective:   Patient was seen and evaluated  Patient was lethargy but able to answer few leading questions  Appears very comfortable  Discussed with family and updated at bedside  Noted events and discussed with the palliative team and also RN    ROS:  Pertinent systems negative except as above.    Current Medications  sodium chloride, 2 mL, 2 times per day      Current Facility-Administered Medications   Medication Dose Route Frequency Provider Last Rate Last Admin     PRN Medications  Current Facility-Administered Medications   Medication Dose Route Frequency Provider Last Rate Last Admin   • morphine injection 1 mg  1 mg Intravenous Q3H PRN Chuyita Bolton MD   1 mg at 11/03/23 1619   • glycopyrrolate (ROBINUL) injection 0.2 mg  0.2 mg Intravenous Q4H PRN Chuyita Bolton MD       • docusate sodium-sennosides (SENOKOT S) 50-8.6 MG 2 tablet  2 tablet Oral BID PRN Chuyita Bolton MD       • bisacodyl (DULCOLAX) suppository 10 mg  10 mg Rectal Daily PRN Chuyita Bolton MD       • magnesium hydroxide (MILK OF MAGNESIA) 400 MG/5ML suspension 30 mL  30 mL Oral Daily PRN Chuyita Bolton MD       • lactulose (CHRONULAC) 10 GM/15ML solution 20 g  20 g Oral Daily PRN Chuyita Bolton MD       • CARBOXYMethylcellulose (REFRESH TEARS) 0.5 % ophthalmic solution 1 drop  1 drop Both Eyes PRN Chuyita Bolton MD       • lidocaine 2% urethral (UROJET) 2 % jelly 10 mL  10 mL Transurethral PRN Chuyita Bolton MD       • aluminum-magnesium hydroxide-simethicone (MAALOX) 200-200-20 MG/5ML suspension 30 mL  30 mL Oral PRN Hoang  Chuyita CARDONA MD       • sodium chloride 0.9 % flush bag 25 mL  25 mL Intravenous PRN Chuyita Bolton MD       • HYDROmorphone (DILAUDID) tablet 2 mg  2 mg Oral Q1H PRN Chuyita Bolton MD        Or   • HYDROmorphone (DILAUDID) injection 0.4 mg  0.4 mg Intravenous Q1H PRN Chuyita Bolton MD   0.4 mg at 11/03/23 1157    Or   • HYDROmorphone (DILAUDID) 1 MG/ML oral solution 2 mg  2 mg Oral Q1H PRN Chuyita oBlton MD       • ondansetron (ZOFRAN ODT) disintegrating tablet 4 mg  4 mg Oral Q12H PRN Chuyita Bolton MD        Or   • ondansetron (ZOFRAN) injection 4 mg  4 mg Intravenous Q12H PRN Chuyita Bolton MD   4 mg at 11/03/23 1045   • LORazepam (ATIVAN) tablet 0.5 mg  0.5 mg Oral Q2H PRN Chuyita Bolton MD       • LORazepam (ATIVAN) injection 0.5 mg  0.5 mg Intravenous Q1H PRN Chuyita Bolton MD   0.5 mg at 11/02/23 0054         PHYSICAL EXAMINATION     Vital 24 Hour Range Most Recent Value   Temperature Temp  Min: 97.1 °F (36.2 °C)  Max: 98.5 °F (36.9 °C) 97.1 °F (36.2 °C)   Pulse Pulse  Min: 102  Max: 102 (!) 105   Respiratory Resp  Min: 14  Max: 20 14   Blood Pressure BP  Min: 143/94  Max: 143/94 (!) 143/94 (RN MADE AWARE)   Pulse Oximetry SpO2  Min: 94 %  Max: 97 % 94 %   Arterial BP No data recorded     O2 O2 Flow Rate (L/min)  Avg: 3 L/min  Min: 3 L/min   Min taken time: 11/03/23 1044  Max: 3 L/min   Max taken time: 11/03/23 1044       Recorded Intake and Output:    Intake/Output Summary (Last 24 hours) at 11/3/2023 1642  Last data filed at 11/3/2023 0224  Gross per 24 hour   Intake 0 ml   Output --   Net 0 ml      Recorded Last Stool Occurrence:       Vital Most Recent Value First Value   Weight       Height       BMI   N/A     Last 4 weights I/O last 3 shifts   There were no vitals filed for this visit. No intake/output data recorded.     Objective:  General Appearance:  Ill-appearing and in no acute distress.    Vital signs: (most recent): Blood pressure (!) 143/94, pulse  (!) 105, temperature 97.1 °F (36.2 °C), temperature source Temporal, resp. rate 14, SpO2 94 %.  No fever.    Lungs:  Normal effort and normal respiratory rate.  There are decreased breath sounds.    Heart: Normal rate.  Regular rhythm.  S1 normal.    Abdomen: Abdomen is soft.    Extremities: (Chronic left leg wound  Right below-knee amputee)  Skin:  Dry.        TEST RESULTS     Labs: The Laboratory values listed below have been reviewed and pertinent findings discussed in the Assessment and Plan.  Recent Labs   Lab 10/31/23  0858 10/30/23  0447 10/28/23  0532   WBC 9.6 9.2 11.4*   HCT 25.4* 27.1* 27.8*   HGB 7.7* 8.2* 8.5*   MCV 91.7 94.1 90.6    219 239   SODIUM 142 142 138   POTASSIUM 4.0 3.9 3.1*   CHLORIDE 110 109 103   CO2 25 24 29   CALCIUM 7.7* 7.9* 7.9*   GLUCOSE 185* 145* 124*   BUN 14 9 8   CREATININE 2.45* 1.99* 2.09*   AST  --  11  --    GPT  --  <6  --    ALKPT  --  93  --    BILIRUBIN  --  0.6  --    ALBUMIN  --  2.1*  --      Glucometer Checks    No results available in last 24 hours  Hemoglobin A1C (%)   Date Value   08/16/2023 5.2     No results found for: \"TSH\"  Urinalysis:  No results found     Radiology: Imaging studies have been reviewed and pertinent findings discussed in the Assessment and Plan.  No results found for any visits on 11/01/23 (from the past 48 hour(s)).     ANCILLARY ORDERS     Diet:  Regular; Patient May Eat for Pleasure if Awake Diet  Telemetry: Off  Consults:    None  Therapy Orders:   No orders of the defined types were placed in this encounter.    Advance Care Planning      ASSESSMENT AND PLAN     Hospital Course:  Jeannette Bumpers is a 75 year old female who admited on 11/1/2023       Principal Problem:    Sepsis (CMD)  Active Problems:    Diabetes mellitus (CMD)    Type 2 diabetes mellitus with diabetic peripheral angiopathy without gangrene (CMD)    Diabetic ulcer of other part of right foot associated with type 2 diabetes mellitus, unspecified ulcer stage (CMD)     Gangrene of left foot (CMD)    Chronic multifocal osteomyelitis of left foot (CMD)    End stage renal disease (CMD)    Acute cystitis      Plan    Sepsis with urinary tract infection/chronic osteomyelitis  Failure to thrive with the supportive care  Continue comfort care with the inpatient hospice service  P.r.n. narcotics and Ativan  No more hemodialysis  Discussed and updated to the family  Adjust medications as needed      Code Status: Do Not Resuscitate           Richard Cr MD  Hospitalist  11/3/2023  4:42 PM      10/2024

## 2024-11-01 NOTE — PHYSICAL THERAPY INITIAL EVALUATION ADULT - NSTOILETINGEQUIP_GEN_A_PT
3:1 commode The patient has mobility limitations that increase their falls risk and impair their endurance. At times, they are limited to staying in one room due to fluctuating pain levels & balance deficits related to post operative weakness. The patient will require a commode to attend safely to their toileting needs./3:1 commode

## 2024-11-01 NOTE — H&P PST ADULT - NSICDXPASTMEDICALHX_GEN_ALL_CORE_FT
PAST MEDICAL HISTORY:  Anxiety     Corneal abnormality history of Fuch's dystrophy    GERD (gastroesophageal reflux disease)     Hypercholesteremia     Migraine headache
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
## 2024-11-01 NOTE — PHYSICAL THERAPY INITIAL EVALUATION ADULT - ADDITIONAL COMMENTS
Patient lives in a private home, no stairs to enter. Patient has 12 steps with B handrails [facility] which continues to only L side up/ R down rail. Patient has a tub/shower combo downstairs and a walk in shower upstairs with both a retractable and fixed shower head. Patient has a standard toilet seat with enough space to fit a 3:1 commode.  Patient wears eye glasses for reading, has no hearing impairments, is right hand dominant and currently drives. Patients current pain at rest is a 5/10 with it increasing to a 9/10 with prolonged standing, walking and stairs. Patient lives in a private home, no stairs to enter. Patient has 12 steps with B handrails longterm which continues to only L side up/ R down rail. Patient has a tub/shower combo downstairs and a walk in shower upstairs with both a retractable and fixed shower head. Patient has a standard toilet seat with enough space to fit a 3:1 commode.  Patient wears eye glasses for reading, has no hearing impairments, is right hand dominant and currently drives. Patients current pain at rest is a 5/10 with it increasing to a 9/10 with prolonged standing, walking and stairs. Patient takes Tylenol to alleviate pain.

## 2024-11-01 NOTE — H&P PST ADULT - PROBLEM SELECTOR PLAN 5
Assessment and Plan: labs - cbc,pt/ptt,inr,cmp,t&s,nose cx,ekg  Medical clearance required.  Pt was already seen by cardiologist.  preop 3 day hibiclens instruction reviewed and given .instructed on if  nose cx positive use mupirocin 5 days and checklist given  take routine meds DOS with sips of water. avoid NSAID and OTC supplements. verbalized understanding  information on proper nutrition , increase protein and better food choices provided in packet  anesthesiologist to review pst labs, ekg, medical clearances and optimization for surgery

## 2024-11-01 NOTE — H&P PST ADULT - MUSCULOSKELETAL
details… right knee/no calf tenderness/decreased ROM due to pain/strength 5/5 bilateral upper extremities/strength 5/5 bilateral lower extremities

## 2024-11-01 NOTE — H&P PST ADULT - HISTORY OF PRESENT ILLNESS
75 y/o female with gerd, hx of stomach ulcer, precancerous breast mass, hx of b/l corneal transplants, hx of lumbar laminectomy/discectomy (2006) here for presurgical examination for planned Robotic assisted right total knee arthroplasty with Dr. Marques on 11/11/2024. Denies recent travels in the past 30 days. No fever, SOB, cough, flu like symptoms or body rash- covid screen.  Goal: To walk pain free

## 2024-11-02 LAB
A1C WITH ESTIMATED AVERAGE GLUCOSE RESULT: 5.8 % — HIGH (ref 4–5.6)
ESTIMATED AVERAGE GLUCOSE: 120 MG/DL — HIGH (ref 68–114)
MRSA PCR RESULT.: SIGNIFICANT CHANGE UP
S AUREUS DNA NOSE QL NAA+PROBE: DETECTED
VIT D25+D1,25 OH+D1,25 PNL SERPL-MCNC: 61.3 PG/ML — SIGNIFICANT CHANGE UP (ref 19.9–79.3)

## 2024-11-03 RX ORDER — MUPIROCIN 20 MG/G
1 OINTMENT TOPICAL
Qty: 22 | Refills: 0
Start: 2024-11-03 | End: 2024-11-07

## 2024-11-08 RX ORDER — RALOXIFENE HYDROCHLORIDE 60 MG/1
60 TABLET, FILM COATED ORAL DAILY
Refills: 0 | Status: DISCONTINUED | OUTPATIENT
Start: 2024-11-11 | End: 2024-11-12

## 2024-11-08 RX ORDER — B-COMPLEX WITH VITAMIN C
1 VIAL (ML) INJECTION DAILY
Refills: 0 | Status: DISCONTINUED | OUTPATIENT
Start: 2024-11-11 | End: 2024-11-12

## 2024-11-08 RX ORDER — CELECOXIB 100 MG
200 CAPSULE ORAL EVERY 12 HOURS
Refills: 0 | Status: DISCONTINUED | OUTPATIENT
Start: 2024-11-12 | End: 2024-11-12

## 2024-11-08 RX ORDER — POLYETHYLENE GLYCOL 3350 17 G/17G
17 POWDER, FOR SOLUTION ORAL AT BEDTIME
Refills: 0 | Status: DISCONTINUED | OUTPATIENT
Start: 2024-11-11 | End: 2024-11-12

## 2024-11-08 RX ORDER — CETIRIZINE HCL 10 MG/1
10 TABLET ORAL DAILY
Refills: 0 | Status: DISCONTINUED | OUTPATIENT
Start: 2024-11-11 | End: 2024-11-12

## 2024-11-08 RX ORDER — ONDANSETRON HYDROCHLORIDE 2 MG/ML
4 INJECTION, SOLUTION INTRAMUSCULAR; INTRAVENOUS EVERY 6 HOURS
Refills: 0 | Status: DISCONTINUED | OUTPATIENT
Start: 2024-11-11 | End: 2024-11-12

## 2024-11-08 RX ORDER — TRAZODONE HYDROCHLORIDE 100 MG/1
50 TABLET ORAL AT BEDTIME
Refills: 0 | Status: DISCONTINUED | OUTPATIENT
Start: 2024-11-11 | End: 2024-11-12

## 2024-11-08 RX ORDER — HYDROMORPHONE HCL/0.9% NACL/PF 6 MG/30 ML
0.5 PATIENT CONTROLLED ANALGESIA SYRINGE INTRAVENOUS
Refills: 0 | Status: COMPLETED | OUTPATIENT
Start: 2024-11-11 | End: 2024-11-18

## 2024-11-08 RX ORDER — ACETAMINOPHEN 500 MG
650 TABLET ORAL EVERY 6 HOURS
Refills: 0 | Status: DISCONTINUED | OUTPATIENT
Start: 2024-11-11 | End: 2024-11-12

## 2024-11-08 RX ORDER — ASPIRIN/MAG CARB/ALUMINUM AMIN 325 MG
81 TABLET ORAL
Refills: 0 | Status: DISCONTINUED | OUTPATIENT
Start: 2024-11-12 | End: 2024-11-12

## 2024-11-08 RX ORDER — OXYCODONE HYDROCHLORIDE 30 MG/1
5 TABLET ORAL EVERY 4 HOURS
Refills: 0 | Status: DISCONTINUED | OUTPATIENT
Start: 2024-11-11 | End: 2024-11-12

## 2024-11-08 RX ORDER — MONTELUKAST SODIUM 10 MG/1
10 TABLET, FILM COATED ORAL DAILY
Refills: 0 | Status: DISCONTINUED | OUTPATIENT
Start: 2024-11-11 | End: 2024-11-12

## 2024-11-08 RX ORDER — SERTRALINE HYDROCHLORIDE 50 MG/1
200 TABLET, FILM COATED ORAL DAILY
Refills: 0 | Status: DISCONTINUED | OUTPATIENT
Start: 2024-11-11 | End: 2024-11-12

## 2024-11-08 RX ORDER — PANTOPRAZOLE SODIUM 40 MG/1
40 TABLET, DELAYED RELEASE ORAL
Refills: 0 | Status: DISCONTINUED | OUTPATIENT
Start: 2024-11-11 | End: 2024-11-12

## 2024-11-08 RX ORDER — SENNA 187 MG
2 TABLET ORAL AT BEDTIME
Refills: 0 | Status: DISCONTINUED | OUTPATIENT
Start: 2024-11-11 | End: 2024-11-12

## 2024-11-08 RX ORDER — OXYCODONE HYDROCHLORIDE 30 MG/1
10 TABLET ORAL EVERY 4 HOURS
Refills: 0 | Status: DISCONTINUED | OUTPATIENT
Start: 2024-11-11 | End: 2024-11-12

## 2024-11-08 RX ORDER — ASCORBIC ACID 500 MG
500 TABLET ORAL
Refills: 0 | Status: DISCONTINUED | OUTPATIENT
Start: 2024-11-11 | End: 2024-11-12

## 2024-11-11 ENCOUNTER — INPATIENT (INPATIENT)
Facility: HOSPITAL | Age: 74
LOS: 0 days | Discharge: HOME HEALTH | End: 2024-11-12
Attending: STUDENT IN AN ORGANIZED HEALTH CARE EDUCATION/TRAINING PROGRAM | Admitting: STUDENT IN AN ORGANIZED HEALTH CARE EDUCATION/TRAINING PROGRAM
Payer: MEDICARE

## 2024-11-11 ENCOUNTER — APPOINTMENT (OUTPATIENT)
Dept: ORTHOPEDIC SURGERY | Facility: HOSPITAL | Age: 74
End: 2024-11-11
Payer: MEDICARE

## 2024-11-11 ENCOUNTER — TRANSCRIPTION ENCOUNTER (OUTPATIENT)
Age: 74
End: 2024-11-11

## 2024-11-11 VITALS
HEART RATE: 75 BPM | HEIGHT: 63 IN | SYSTOLIC BLOOD PRESSURE: 124 MMHG | OXYGEN SATURATION: 95 % | DIASTOLIC BLOOD PRESSURE: 79 MMHG | WEIGHT: 218.04 LBS | RESPIRATION RATE: 16 BRPM | TEMPERATURE: 99 F

## 2024-11-11 DIAGNOSIS — Z98.890 OTHER SPECIFIED POSTPROCEDURAL STATES: Chronic | ICD-10-CM

## 2024-11-11 DIAGNOSIS — S02.2XXA FRACTURE OF NASAL BONES, INITIAL ENCOUNTER FOR CLOSED FRACTURE: Chronic | ICD-10-CM

## 2024-11-11 DIAGNOSIS — Z84.89 FAMILY HISTORY OF OTHER SPECIFIED CONDITIONS: Chronic | ICD-10-CM

## 2024-11-11 DIAGNOSIS — Z94.7 CORNEAL TRANSPLANT STATUS: Chronic | ICD-10-CM

## 2024-11-11 DIAGNOSIS — Z98.89 OTHER SPECIFIED POSTPROCEDURAL STATES: Chronic | ICD-10-CM

## 2024-11-11 LAB
ANION GAP SERPL CALC-SCNC: 4 MMOL/L — LOW (ref 5–17)
BLD GP AB SCN SERPL QL: SIGNIFICANT CHANGE UP
BUN SERPL-MCNC: 16 MG/DL — SIGNIFICANT CHANGE UP (ref 7–23)
CALCIUM SERPL-MCNC: 8.5 MG/DL — SIGNIFICANT CHANGE UP (ref 8.5–10.1)
CHLORIDE SERPL-SCNC: 107 MMOL/L — SIGNIFICANT CHANGE UP (ref 96–108)
CO2 SERPL-SCNC: 29 MMOL/L — SIGNIFICANT CHANGE UP (ref 22–31)
CREAT SERPL-MCNC: 0.98 MG/DL — SIGNIFICANT CHANGE UP (ref 0.5–1.3)
EGFR: 61 ML/MIN/1.73M2 — SIGNIFICANT CHANGE UP
GLUCOSE BLDC GLUCOMTR-MCNC: 102 MG/DL — HIGH (ref 70–99)
GLUCOSE SERPL-MCNC: 103 MG/DL — HIGH (ref 70–99)
HCT VFR BLD CALC: 30.9 % — LOW (ref 34.5–45)
HGB BLD-MCNC: 9.3 G/DL — LOW (ref 11.5–15.5)
MCHC RBC-ENTMCNC: 18.2 PG — LOW (ref 27–34)
MCHC RBC-ENTMCNC: 30.1 G/DL — LOW (ref 32–36)
MCV RBC AUTO: 60.5 FL — LOW (ref 80–100)
NRBC # BLD: 0 /100 WBCS — SIGNIFICANT CHANGE UP (ref 0–0)
PLATELET # BLD AUTO: 256 K/UL — SIGNIFICANT CHANGE UP (ref 150–400)
POTASSIUM SERPL-MCNC: 4.8 MMOL/L — SIGNIFICANT CHANGE UP (ref 3.5–5.3)
POTASSIUM SERPL-SCNC: 4.8 MMOL/L — SIGNIFICANT CHANGE UP (ref 3.5–5.3)
RBC # BLD: 5.11 M/UL — SIGNIFICANT CHANGE UP (ref 3.8–5.2)
RBC # FLD: 18.9 % — HIGH (ref 10.3–14.5)
SODIUM SERPL-SCNC: 140 MMOL/L — SIGNIFICANT CHANGE UP (ref 135–145)
WBC # BLD: 11.18 K/UL — HIGH (ref 3.8–10.5)
WBC # FLD AUTO: 11.18 K/UL — HIGH (ref 3.8–10.5)

## 2024-11-11 PROCEDURE — 20985 CPTR-ASST DIR MS PX: CPT

## 2024-11-11 PROCEDURE — 73560 X-RAY EXAM OF KNEE 1 OR 2: CPT | Mod: 26,RT

## 2024-11-11 PROCEDURE — 27447 TOTAL KNEE ARTHROPLASTY: CPT | Mod: RT

## 2024-11-11 DEVICE — INSERT TIB BEARING CS X3 SZ 4 10MM: Type: IMPLANTABLE DEVICE | Site: RIGHT | Status: FUNCTIONAL

## 2024-11-11 DEVICE — COMP FEM CR CMNTLSS BEADED W/ PA SZ 4 RT: Type: IMPLANTABLE DEVICE | Site: RIGHT | Status: FUNCTIONAL

## 2024-11-11 DEVICE — MAKO BONE PIN 3.2MM X 110MM: Type: IMPLANTABLE DEVICE | Site: RIGHT | Status: FUNCTIONAL

## 2024-11-11 DEVICE — PATELLA ASYMM TRIATHLON SZ A 32X10MM: Type: IMPLANTABLE DEVICE | Site: RIGHT | Status: FUNCTIONAL

## 2024-11-11 DEVICE — MAKO BONE PIN 3.2MM X 140MM: Type: IMPLANTABLE DEVICE | Site: RIGHT | Status: FUNCTIONAL

## 2024-11-11 DEVICE — BASEPLATE TIB TRIATHLON TRITAN SZ 4: Type: IMPLANTABLE DEVICE | Site: RIGHT | Status: FUNCTIONAL

## 2024-11-11 RX ORDER — MELATONIN 5 MG
3 TABLET ORAL AT BEDTIME
Refills: 0 | Status: DISCONTINUED | OUTPATIENT
Start: 2024-11-11 | End: 2024-11-12

## 2024-11-11 RX ORDER — MONTELUKAST SODIUM 10 MG/1
1 TABLET, FILM COATED ORAL
Refills: 0 | DISCHARGE

## 2024-11-11 RX ORDER — EZETIMIBE 10 MG/1
1 TABLET ORAL
Refills: 0 | DISCHARGE

## 2024-11-11 RX ORDER — CELECOXIB 100 MG
200 CAPSULE ORAL ONCE
Refills: 0 | Status: COMPLETED | OUTPATIENT
Start: 2024-11-11 | End: 2024-11-11

## 2024-11-11 RX ORDER — HYDROMORPHONE HCL/0.9% NACL/PF 6 MG/30 ML
0.5 PATIENT CONTROLLED ANALGESIA SYRINGE INTRAVENOUS
Refills: 0 | Status: DISCONTINUED | OUTPATIENT
Start: 2024-11-11 | End: 2024-11-12

## 2024-11-11 RX ORDER — DEXAMETHASONE 1.5 MG 1.5 MG/1
10 TABLET ORAL ONCE
Refills: 0 | Status: COMPLETED | OUTPATIENT
Start: 2024-11-12 | End: 2024-11-12

## 2024-11-11 RX ORDER — ACETAMINOPHEN 500 MG
650 TABLET ORAL ONCE
Refills: 0 | Status: COMPLETED | OUTPATIENT
Start: 2024-11-11 | End: 2024-11-11

## 2024-11-11 RX ORDER — ACETAMINOPHEN 500 MG
1000 TABLET ORAL ONCE
Refills: 0 | Status: DISCONTINUED | OUTPATIENT
Start: 2024-11-11 | End: 2024-11-12

## 2024-11-11 RX ORDER — OMEPRAZOLE 10 MG
1 CAPSULE,DELAYED RELEASE (ENTERIC COATED) ORAL
Refills: 0 | DISCHARGE

## 2024-11-11 RX ORDER — ACETAMINOPHEN 500 MG
1000 TABLET ORAL ONCE
Refills: 0 | Status: COMPLETED | OUTPATIENT
Start: 2024-11-11 | End: 2024-11-11

## 2024-11-11 RX ORDER — SODIUM CHLORIDE 9 MG/ML
3 INJECTION, SOLUTION INTRAMUSCULAR; INTRAVENOUS; SUBCUTANEOUS EVERY 8 HOURS
Refills: 0 | Status: DISCONTINUED | OUTPATIENT
Start: 2024-11-11 | End: 2024-11-11

## 2024-11-11 RX ORDER — CEFAZOLIN SODIUM 1 G
2000 VIAL (EA) INJECTION EVERY 8 HOURS
Refills: 0 | Status: COMPLETED | OUTPATIENT
Start: 2024-11-11 | End: 2024-11-12

## 2024-11-11 RX ORDER — HYDROMORPHONE HCL/0.9% NACL/PF 6 MG/30 ML
0.2 PATIENT CONTROLLED ANALGESIA SYRINGE INTRAVENOUS
Refills: 0 | Status: DISCONTINUED | OUTPATIENT
Start: 2024-11-11 | End: 2024-11-11

## 2024-11-11 RX ORDER — ONDANSETRON HYDROCHLORIDE 2 MG/ML
4 INJECTION, SOLUTION INTRAMUSCULAR; INTRAVENOUS ONCE
Refills: 0 | Status: DISCONTINUED | OUTPATIENT
Start: 2024-11-11 | End: 2024-11-11

## 2024-11-11 RX ADMIN — Medication 115 MILLILITER(S): at 18:31

## 2024-11-11 RX ADMIN — Medication 100 MILLIGRAM(S): at 21:27

## 2024-11-11 RX ADMIN — Medication 200 MILLIGRAM(S): at 13:11

## 2024-11-11 RX ADMIN — Medication 2 TABLET(S): at 21:26

## 2024-11-11 RX ADMIN — Medication 1000 MILLIGRAM(S): at 19:30

## 2024-11-11 RX ADMIN — Medication 500 MILLIGRAM(S): at 18:29

## 2024-11-11 RX ADMIN — OXYCODONE HYDROCHLORIDE 10 MILLIGRAM(S): 30 TABLET ORAL at 20:31

## 2024-11-11 RX ADMIN — Medication 650 MILLIGRAM(S): at 13:12

## 2024-11-11 RX ADMIN — OXYCODONE HYDROCHLORIDE 10 MILLIGRAM(S): 30 TABLET ORAL at 19:31

## 2024-11-11 RX ADMIN — Medication 80 MILLIGRAM(S): at 21:25

## 2024-11-11 RX ADMIN — POLYETHYLENE GLYCOL 3350 17 GRAM(S): 17 POWDER, FOR SOLUTION ORAL at 21:26

## 2024-11-11 RX ADMIN — Medication 400 MILLIGRAM(S): at 18:29

## 2024-11-11 RX ADMIN — OXYCODONE HYDROCHLORIDE 10 MILLIGRAM(S): 30 TABLET ORAL at 23:33

## 2024-11-11 RX ADMIN — Medication 0.5 MILLIGRAM(S): at 21:58

## 2024-11-11 RX ADMIN — Medication 650 MILLIGRAM(S): at 23:33

## 2024-11-11 RX ADMIN — Medication 0.5 MILLIGRAM(S): at 20:58

## 2024-11-11 RX ADMIN — TRAZODONE HYDROCHLORIDE 50 MILLIGRAM(S): 100 TABLET ORAL at 21:25

## 2024-11-11 NOTE — DISCHARGE NOTE PROVIDER - NPI NUMBER (FOR SYSADMIN USE ONLY) :
Fabienne called from Dr. Soriano office, port placement will be decided by pt medical oncologist.  
Fabienne from Dr. Soriano office called requesting to speak with the nurse in regards to the patient possibly needing a port for chemo. Would like a call back at [phone].   
[[phone]]

## 2024-11-11 NOTE — DISCHARGE NOTE PROVIDER - NSDCFUADDINST_GEN_ALL_CORE_FT
Keep knee straight while at rest. Elevate the leg as much as possible ("toes above the nose") to help control swelling. Make sure you get up and take a brief walk every two hours to help with circulation and prevent stiffness. Incentive spirometer 10X/hour. Cryocuff to help with pain/inflammation.  Keep ODALYS Dressing Clean, Dry and Intact. May shower with ODALYS Dressing. Please do not scrub, soak, peel or pick at the ODALYS dressing. No creams, lotions, or oils over dressing. May shower and let water run over dressing, no baths. Pat dry once out of shower. Dressing to be removed in 7 days. Discard dressing and battery in garbage. If dressing is saturated from border to border - may remove and replace with clean dry dressing.  Shower instructions for ODALYS Dressing: Place battery pack in a water sealed bag (such as a Ziplock bag) and keep battery out of the water.   Alternately you can turn battery pack off (press orange button.) Twist tubing OFF battery pack before entering shower. Once done with showering. Pat dressing dry. Reconnect tubing and twist ON battery pack after you are dry. Then turn battery pack on (press orange button.)

## 2024-11-11 NOTE — PHYSICAL THERAPY INITIAL EVALUATION ADULT - GAIT TRAINING, PT EVAL
Pt will improve ambulation to ~300 feet Independently using rolling walker within 2 weeks. Pt will negotiate ~12 steps c Supervision c rail within 2 weeks.

## 2024-11-11 NOTE — CONSULT NOTE ADULT - SUBJECTIVE AND OBJECTIVE BOX
MINISTERIO ALARCON is a 74y Female s/p ROBOTIC ASSISTED RIGHT TOTAL KNEE ARTHROPLASTY WITH BRITTANY      w/ h/o Hypercholesteremia    Corneal abnormality    Anxiety    Migraine headache    GERD (gastroesophageal reflux disease)      denies any chest pain shortness of breath palpitation dizziness lightheadedness nausea vomiting fever or chills    H/O right cataract extraction    FH: JUN-BSO (total abdominal hysterectomy and bilateral salpingo-oophorectomy)    History of laminectomy    S/P right knee arthroscopy    S/P left knee arthroscopy    Nose fracture    S/P eye surgery    History of corneal transplant      No pertinent family history in first degree relatives    FH: heart disease (Father)      SH: doesnot smoke or drink at this time    No Known Allergies    acetaminophen     Tablet .. 650 milliGRAM(s) Oral every 6 hours  acetaminophen   IVPB .. 1000 milliGRAM(s) IV Intermittent once  ascorbic acid 500 milliGRAM(s) Oral two times a day  atorvastatin 80 milliGRAM(s) Oral at bedtime  ceFAZolin   IVPB 2000 milliGRAM(s) IV Intermittent every 8 hours  cetirizine 10 milliGRAM(s) Oral daily  HYDROmorphone  Injectable 0.5 milliGRAM(s) IV Push every 3 hours PRN  lactated ringers. 1000 milliLiter(s) IV Continuous <Continuous>  melatonin 3 milliGRAM(s) Oral at bedtime  montelukast 10 milliGRAM(s) Oral daily  multivitamin 1 Tablet(s) Oral daily  ondansetron Injectable 4 milliGRAM(s) IV Push every 6 hours PRN  oxyCODONE    IR 5 milliGRAM(s) Oral every 4 hours PRN  oxyCODONE    IR 10 milliGRAM(s) Oral every 4 hours PRN  pantoprazole    Tablet 40 milliGRAM(s) Oral before breakfast  polyethylene glycol 3350 17 Gram(s) Oral at bedtime  raloxifene 60 milliGRAM(s) Oral daily  senna 2 Tablet(s) Oral at bedtime  sertraline 200 milliGRAM(s) Oral daily  traZODone 50 milliGRAM(s) Oral at bedtime    T(C): 36.7 (11-11-24 @ 20:37), Max: 37.1 (11-11-24 @ 12:49)  HR: 85 (11-11-24 @ 20:37) (69 - 85)  BP: 145/76 (11-11-24 @ 20:37) (109/61 - 145/76)  RR: 18 (11-11-24 @ 20:37) (12 - 18)  SpO2: 93% (11-11-24 @ 20:37) (93% - 98%)  HEENT unremarkable  neck no JVD or bruit  heart normal S1 S2 RRR no gallops or rubs  chest clear to auscultation  abd sof nontender non distended +bs  ext no calf tenderness    A/P   DVT PX  pain control  bowel regimen   wound care as per ortho  GI PX  antiemetics prn  incentive spirometer

## 2024-11-11 NOTE — DISCHARGE NOTE PROVIDER - CARE PROVIDER_API CALL
Bo Marques  Orthopaedic Surgery  [address], [address]  [place] [address]  Phone: [phone]  Fax: [phone]  Follow Up Time:

## 2024-11-11 NOTE — PHYSICAL THERAPY INITIAL EVALUATION ADULT - RANGE OF MOTION EXAMINATION, REHAB EVAL
except right knee flexion 0-70 degrees due to pain./bilateral upper extremity ROM was WFL (within functional limits)/bilateral lower extremity ROM was WFL (within functional limits)/deficits as listed below

## 2024-11-11 NOTE — DISCHARGE NOTE PROVIDER - NSDCFUSCHEDAPPT_GEN_ALL_CORE_FT
Bo Marques  [facility]  [address]  Scheduled Appointment: 11/22/2024    Brielle Ace  [facility]  [address]ALM [address]  Scheduled Appointment: 12/23/2024

## 2024-11-11 NOTE — PHYSICAL THERAPY INITIAL EVALUATION ADULT - ADDITIONAL COMMENTS
Confirmed from pre-op notes: Patient lives c spouse in a private home, no stairs to enter. Patient has 12 steps with B handrails snf which continues to only L side up/ R down rail. As per patient, will stay on the 1st floor. Patient has a tub/shower combo downstairs and a walk in shower upstairs with both a retractable and fixed shower head. Patient has a standard toilet seat with enough space to fit a 3:1 commode. Pt was Independent with all ADL's and ambulation without device.

## 2024-11-11 NOTE — PROGRESS NOTE ADULT - SUBJECTIVE AND OBJECTIVE BOX
Patient is 74y y/o Female s/p  R TKA POD#0  Patient is seen and examined at bedside.   Pt tolerated procedure well without any intra-op complications.    Pain is controlled.  Denies CP/SOB/Dizziness/N/V/D/HA.     Vital Signs Last 24 Hrs  T(C): 36.5 (11 Nov 2024 17:27), Max: 37.1 (11 Nov 2024 12:49)  T(F): 97.7 (11 Nov 2024 17:27), Max: 98.7 (11 Nov 2024 12:49)  HR: 72 (11 Nov 2024 17:27) (69 - 79)  BP: 120/70 (11 Nov 2024 17:27) (109/61 - 124/79)  BP(mean): --  RR: 18 (11 Nov 2024 17:27) (12 - 18)  SpO2: 93% (11 Nov 2024 17:27) (93% - 98%)    Parameters below as of 11 Nov 2024 17:27  Patient On (Oxygen Delivery Method): room air          PHYSICAL EXAM:  General: A&Ox3 NAD  RLE: Dressing C/D/I with ACE wrap in place. Motor intact + EHL/FHL/TA/GS.  Sensation is grossly intact.  Extremity warm, compartments soft, compressible. No calf tenderness. DP 2+   LLE: Motor intact +EHL/FHL/TA/GS. Sensation is grossly intact. Extremity warm, compartments soft, compressible. No calf tenderness. DP2+    Labs:                          9.3    11.18 )-----------( 256      ( 11 Nov 2024 16:40 )             30.9       11-11    140  |  107  |  16  ----------------------------<  103[H]  4.8   |  29  |  0.98    Ca    8.5      11 Nov 2024 16:40        A/P: Patient is a 74y y/o Female s/p R TKA, POD # 0  -wound care, knee extension/leg elevation, cryocuff, isometric exercises, new medications reviewed with pt  -Pain control/analgesia  -Inc spirometry reviewed with pt, demonstrated competence  -DVT prophylaxis with Venodynes/Aspirin 81 BID  -F/U AM Labs  -PT/OT/WBAT  -prophylactic Antibiotic  -medical consult  -DC planning

## 2024-11-11 NOTE — DISCHARGE NOTE PROVIDER - HOSPITAL COURSE
74yFemale with history of Right knee OA presenting for R TKA by Dr. Marques on 11/11/24. Risk and benefits of surgery were explained to the patient. The patient understood and agreed to proceed with surgery. Patient underwent the procedure with no intraoperative complications. Pt was brought in stable condition to the PACU. Once stable in PACU, pt was brought to the floor. During hospital stay pt was followed by Medicine, physical therapy, Home Care during this admission. Pt is stable for discharge 74yFemale with history of Right knee OA presenting for R TKA by Dr. Marques on 11/11/24. Risk and benefits of surgery were explained to the patient. The patient understood and agreed to proceed with surgery. Patient underwent the procedure with no intraoperative complications. Pt was brought in stable condition to the PACU. Once stable in PACU, pt was brought to the floor. During hospital stay pt was followed by Medicine, physical therapy, Home Care during this admission. Pt is stable for discharge home

## 2024-11-11 NOTE — OCCUPATIONAL THERAPY INITIAL EVALUATION ADULT - STRENGTHENING, PT EVAL
Pt will show improvement in R LE strength by 1-2 grades in order to improve balance and performance of ADLs and functional transfers.

## 2024-11-11 NOTE — DISCHARGE NOTE PROVIDER - NSDCMRMEDTOKEN_GEN_ALL_CORE_FT
atorvastatin 80 mg oral tablet: 1 tab(s) orally once a day  cetirizine 10 mg oral capsule: 1 cap(s) orally once a day  ezetimibe 10 mg oral tablet: 1 tab(s) orally once a day  montelukast 10 mg oral tablet: 1 tab(s) orally once a day  mupirocin 2% topical ointment: Apply topically to affected area 2 times a day apply in both nostrils for 5 days twice daily, 5 days before surgery.  raloxifene 60 mg oral tablet: 1 tab(s) orally  sertraline 200 mg oral capsule: 1 cap(s) orally once a day  traZODone 50 mg oral tablet: orally  vitamin d 67250 once a week:    acetaminophen 325 mg oral tablet: 2 tab(s) orally every 6 hours  ascorbic acid 500 mg oral tablet: 1 tab(s) orally 2 times a day  aspirin 81 mg oral delayed release tablet: 1 tab(s) orally 2 times a day Deep Vein Thrombosis Prevention  atorvastatin 80 mg oral tablet: 1 tab(s) orally once a day (at bedtime)  CeleBREX 200 mg oral capsule: 1 cap(s) orally 2 times a day Pain, inflammation, MDD: 2  cetirizine 10 mg oral tablet: 1 tab(s) orally once a day  Colace 100 mg oral capsule: 1 cap(s) orally 2 times a day Constipation prevention MDD: 2  ezetimibe 10 mg oral tablet: 1 tab(s) orally once a day  montelukast 10 mg oral tablet: 1 tab(s) orally once a day  Multiple Vitamins oral tablet: 1 tab(s) orally once a day  Narcan 4 mg/0.1 mL nasal spray: 4 milligram(s) intranasally once Required with opioid Rx for suspected overdose.  oxyCODONE 5 mg oral tablet: 1 tab(s) orally every 4 hours as needed for  moderate pain For severe pain- take 2 tablets. MDD: 8  pantoprazole 40 mg oral delayed release tablet: 1 tab(s) orally once a day To prevent upset stomach. MDD: 1  raloxifene 60 mg oral tablet: 1 tab(s) orally once a day  sertraline 100 mg oral tablet: 2 tab(s) orally once a day  traZODone 50 mg oral tablet: 1 tab(s) orally once a day (at bedtime)  vitamin d 51342 once a week:

## 2024-11-11 NOTE — PHYSICAL THERAPY INITIAL EVALUATION ADULT - PERTINENT HX OF CURRENT PROBLEM, REHAB EVAL
Patient is a 73 y/o female admitted to [facility] due to elective S/P Right TKA POD#0.

## 2024-11-11 NOTE — PHYSICAL THERAPY INITIAL EVALUATION ADULT - GENERAL OBSERVATIONS, REHAB EVAL
Chart (EMR) reviewed. Received supine c HOB elevated, NAD. OT Yesenia present. +heplock, +ODALYS dressing c ace wrap to right knee intact, +B/L SCD's donned. Family present. Alert. Ox4. Able to follow multistep commands/directions. Educated c knee precautions and provided c TKA packet and green strap.

## 2024-11-11 NOTE — OCCUPATIONAL THERAPY INITIAL EVALUATION ADULT - GENERAL OBSERVATIONS, REHAB EVAL
Chart reviewed. Pt encountered semi-supine in bed, NAD, +IV heplock, +ODALYS dressing and ace wrap to R LE clean and intact. A&Ox4. Pt agreeable to OT adina.

## 2024-11-11 NOTE — OCCUPATIONAL THERAPY INITIAL EVALUATION ADULT - ADDITIONAL COMMENTS
Patient lives c spouse in a private home, no stairs to enter. Patient has 12 steps with B handrails [facility] which continues to only L side up/ R down rail. As per patient, will stay on the 1st floor. Patient has a tub/shower combo downstairs and a walk in shower upstairs with both a retractable and fixed shower head. Patient has a standard toilet seat with enough space to fit a 3:1 commode. Pt was Independent with all ADL's and ambulation without device.

## 2024-11-11 NOTE — DISCHARGE NOTE PROVIDER - NSDCHHBASESERVICE_GEN_ALL_CORE
Physical therapy was tested/No. CORBY screening performed.  STOP BANG Legend: 0-2 = LOW Risk; 3-4 = INTERMEDIATE Risk; 5-8 = HIGH Risk

## 2024-11-12 ENCOUNTER — TRANSCRIPTION ENCOUNTER (OUTPATIENT)
Age: 74
End: 2024-11-12

## 2024-11-12 VITALS
OXYGEN SATURATION: 91 % | SYSTOLIC BLOOD PRESSURE: 116 MMHG | RESPIRATION RATE: 17 BRPM | HEART RATE: 74 BPM | TEMPERATURE: 98 F | DIASTOLIC BLOOD PRESSURE: 77 MMHG

## 2024-11-12 LAB
ANION GAP SERPL CALC-SCNC: 6 MMOL/L — SIGNIFICANT CHANGE UP (ref 5–17)
BUN SERPL-MCNC: 18 MG/DL — SIGNIFICANT CHANGE UP (ref 7–23)
CALCIUM SERPL-MCNC: 8.7 MG/DL — SIGNIFICANT CHANGE UP (ref 8.5–10.1)
CHLORIDE SERPL-SCNC: 100 MMOL/L — SIGNIFICANT CHANGE UP (ref 96–108)
CO2 SERPL-SCNC: 28 MMOL/L — SIGNIFICANT CHANGE UP (ref 22–31)
CREAT SERPL-MCNC: 1.06 MG/DL — SIGNIFICANT CHANGE UP (ref 0.5–1.3)
EGFR: 56 ML/MIN/1.73M2 — LOW
GLUCOSE SERPL-MCNC: 143 MG/DL — HIGH (ref 70–99)
HCT VFR BLD CALC: 32.1 % — LOW (ref 34.5–45)
HGB BLD-MCNC: 9.5 G/DL — LOW (ref 11.5–15.5)
MCHC RBC-ENTMCNC: 17.9 PG — LOW (ref 27–34)
MCHC RBC-ENTMCNC: 29.6 G/DL — LOW (ref 32–36)
MCV RBC AUTO: 60.3 FL — LOW (ref 80–100)
NRBC # BLD: 0 /100 WBCS — SIGNIFICANT CHANGE UP (ref 0–0)
PLATELET # BLD AUTO: 252 K/UL — SIGNIFICANT CHANGE UP (ref 150–400)
POTASSIUM SERPL-MCNC: 5.1 MMOL/L — SIGNIFICANT CHANGE UP (ref 3.5–5.3)
POTASSIUM SERPL-SCNC: 5.1 MMOL/L — SIGNIFICANT CHANGE UP (ref 3.5–5.3)
RBC # BLD: 5.32 M/UL — HIGH (ref 3.8–5.2)
RBC # FLD: 18.6 % — HIGH (ref 10.3–14.5)
SODIUM SERPL-SCNC: 134 MMOL/L — LOW (ref 135–145)
WBC # BLD: 14.87 K/UL — HIGH (ref 3.8–10.5)
WBC # FLD AUTO: 14.87 K/UL — HIGH (ref 3.8–10.5)

## 2024-11-12 RX ORDER — CETIRIZINE HCL 10 MG/1
1 TABLET ORAL
Qty: 0 | Refills: 0 | DISCHARGE
Start: 2024-11-12

## 2024-11-12 RX ORDER — CELECOXIB 100 MG
1 CAPSULE ORAL
Qty: 60 | Refills: 0
Start: 2024-11-12 | End: 2024-12-11

## 2024-11-12 RX ORDER — ASCORBIC ACID 500 MG
1 TABLET ORAL
Qty: 0 | Refills: 0 | DISCHARGE
Start: 2024-11-12

## 2024-11-12 RX ORDER — B-COMPLEX WITH VITAMIN C
1 VIAL (ML) INJECTION
Qty: 0 | Refills: 0 | DISCHARGE
Start: 2024-11-12

## 2024-11-12 RX ORDER — OXYCODONE HYDROCHLORIDE 30 MG/1
1 TABLET ORAL
Qty: 42 | Refills: 0
Start: 2024-11-12 | End: 2024-11-18

## 2024-11-12 RX ORDER — NALOXONE HYDROCHLORIDE 1 MG/ML
4 INJECTION, SOLUTION INTRAMUSCULAR; INTRAVENOUS; SUBCUTANEOUS
Qty: 1 | Refills: 0
Start: 2024-11-12 | End: 2024-11-12

## 2024-11-12 RX ORDER — TRAZODONE HYDROCHLORIDE 100 MG/1
1 TABLET ORAL
Qty: 0 | Refills: 0 | DISCHARGE
Start: 2024-11-12

## 2024-11-12 RX ORDER — ASPIRIN/MAG CARB/ALUMINUM AMIN 325 MG
1 TABLET ORAL
Qty: 60 | Refills: 0
Start: 2024-11-12 | End: 2024-12-11

## 2024-11-12 RX ORDER — RALOXIFENE HYDROCHLORIDE 60 MG/1
1 TABLET, FILM COATED ORAL
Qty: 0 | Refills: 0 | DISCHARGE
Start: 2024-11-12

## 2024-11-12 RX ORDER — PANTOPRAZOLE SODIUM 40 MG/1
1 TABLET, DELAYED RELEASE ORAL
Qty: 30 | Refills: 0
Start: 2024-11-12 | End: 2024-12-11

## 2024-11-12 RX ORDER — ACETAMINOPHEN 500 MG
2 TABLET ORAL
Qty: 0 | Refills: 0 | DISCHARGE
Start: 2024-11-12

## 2024-11-12 RX ORDER — CETIRIZINE HCL 10 MG/1
1 TABLET ORAL
Refills: 0 | DISCHARGE

## 2024-11-12 RX ORDER — DOCUSATE SODIUM 100 MG
1 CAPSULE ORAL
Qty: 14 | Refills: 0
Start: 2024-11-12 | End: 2024-11-18

## 2024-11-12 RX ORDER — SERTRALINE HYDROCHLORIDE 50 MG/1
1 TABLET, FILM COATED ORAL
Refills: 0 | DISCHARGE

## 2024-11-12 RX ORDER — TRAZODONE HYDROCHLORIDE 100 MG/1
0 TABLET ORAL
Refills: 0 | DISCHARGE

## 2024-11-12 RX ORDER — RALOXIFENE HYDROCHLORIDE 60 MG/1
1 TABLET, FILM COATED ORAL
Refills: 0 | DISCHARGE

## 2024-11-12 RX ORDER — SERTRALINE HYDROCHLORIDE 50 MG/1
2 TABLET, FILM COATED ORAL
Qty: 0 | Refills: 0 | DISCHARGE
Start: 2024-11-12

## 2024-11-12 RX ADMIN — OXYCODONE HYDROCHLORIDE 5 MILLIGRAM(S): 30 TABLET ORAL at 06:44

## 2024-11-12 RX ADMIN — SERTRALINE HYDROCHLORIDE 200 MILLIGRAM(S): 50 TABLET, FILM COATED ORAL at 05:56

## 2024-11-12 RX ADMIN — OXYCODONE HYDROCHLORIDE 10 MILLIGRAM(S): 30 TABLET ORAL at 00:33

## 2024-11-12 RX ADMIN — MONTELUKAST SODIUM 10 MILLIGRAM(S): 10 TABLET, FILM COATED ORAL at 11:31

## 2024-11-12 RX ADMIN — Medication 650 MILLIGRAM(S): at 12:30

## 2024-11-12 RX ADMIN — CETIRIZINE HCL 10 MILLIGRAM(S): 10 TABLET ORAL at 11:32

## 2024-11-12 RX ADMIN — Medication 0.5 MILLIGRAM(S): at 03:21

## 2024-11-12 RX ADMIN — Medication 1 TABLET(S): at 11:33

## 2024-11-12 RX ADMIN — Medication 200 MILLIGRAM(S): at 05:44

## 2024-11-12 RX ADMIN — Medication 650 MILLIGRAM(S): at 11:33

## 2024-11-12 RX ADMIN — OXYCODONE HYDROCHLORIDE 5 MILLIGRAM(S): 30 TABLET ORAL at 06:01

## 2024-11-12 RX ADMIN — Medication 0.5 MILLIGRAM(S): at 08:40

## 2024-11-12 RX ADMIN — Medication 81 MILLIGRAM(S): at 05:45

## 2024-11-12 RX ADMIN — OXYCODONE HYDROCHLORIDE 10 MILLIGRAM(S): 30 TABLET ORAL at 11:31

## 2024-11-12 RX ADMIN — RALOXIFENE HYDROCHLORIDE 60 MILLIGRAM(S): 60 TABLET, FILM COATED ORAL at 13:12

## 2024-11-12 RX ADMIN — OXYCODONE HYDROCHLORIDE 10 MILLIGRAM(S): 30 TABLET ORAL at 12:30

## 2024-11-12 RX ADMIN — PANTOPRAZOLE SODIUM 40 MILLIGRAM(S): 40 TABLET, DELAYED RELEASE ORAL at 05:44

## 2024-11-12 RX ADMIN — DEXAMETHASONE 1.5 MG 102 MILLIGRAM(S): 1.5 TABLET ORAL at 05:45

## 2024-11-12 RX ADMIN — Medication 115 MILLILITER(S): at 05:56

## 2024-11-12 RX ADMIN — Medication 0.5 MILLIGRAM(S): at 02:21

## 2024-11-12 RX ADMIN — Medication 0.5 MILLIGRAM(S): at 09:40

## 2024-11-12 RX ADMIN — Medication 100 MILLIGRAM(S): at 05:45

## 2024-11-12 RX ADMIN — Medication 500 MILLIGRAM(S): at 05:45

## 2024-11-12 RX ADMIN — Medication 650 MILLIGRAM(S): at 06:44

## 2024-11-12 RX ADMIN — Medication 650 MILLIGRAM(S): at 05:44

## 2024-11-12 RX ADMIN — Medication 200 MILLIGRAM(S): at 06:44

## 2024-11-12 RX ADMIN — Medication 650 MILLIGRAM(S): at 00:33

## 2024-11-12 NOTE — DISCHARGE NOTE NURSING/CASE MANAGEMENT/SOCIAL WORK - FINANCIAL ASSISTANCE
[facility] provides services at a reduced cost to those who are determined to be eligible through [facility]’s financial assistance program. Information regarding [facility]’s financial assistance program can be found by going to [email or web address] or by calling [phone].

## 2024-11-12 NOTE — PROGRESS NOTE ADULT - SUBJECTIVE AND OBJECTIVE BOX
MINISTERIO ALARCON is a 74y Female s/p ROBOTIC ASSISTED RIGHT TOTAL KNEE ARTHROPLASTY WITH BRITTANY        denies any chest pain shortness of breath palpitation dizziness lightheadedness nausea vomiting fever or chills    T(C): 36.5 (11-12-24 @ 08:30), Max: 37.1 (11-11-24 @ 12:49)  HR: 79 (11-12-24 @ 08:30) (69 - 85)  BP: 127/73 (11-12-24 @ 08:30) (109/61 - 145/76)  RR: 17 (11-12-24 @ 08:30) (12 - 18)  SpO2: 91% (11-12-24 @ 08:30) (91% - 98%)  no jvd/bruit  s1 s2 rrr  cta  s/nt/nd  no calf tend                          9.5    14.87 )-----------( 252      ( 12 Nov 2024 05:40 )             32.1   11-12    134[L]  |  100  |  18  ----------------------------<  143[H]  5.1   |  28  |  1.06    Ca    8.7      12 Nov 2024 05:40      cont dvt px  pain control  bowel regimen  antiemetics  incentive spirometer

## 2024-11-12 NOTE — PROGRESS NOTE ADULT - SUBJECTIVE AND OBJECTIVE BOX
Patient is s/p R TKA under spinal anesthesia POD#1. Pt tolerated procedure well without any intra-op complications. Pt doing well at this time.  Denies CP/SOB/Dizziness/N/V/D/HA. Pain is controlled.     Vital Signs Last 24 Hrs  T(C): 36.5 (12 Nov 2024 08:30), Max: 37.1 (11 Nov 2024 12:49)  T(F): 97.7 (12 Nov 2024 08:30), Max: 98.7 (11 Nov 2024 12:49)  HR: 79 (12 Nov 2024 08:30) (69 - 85)  BP: 127/73 (12 Nov 2024 08:30) (109/61 - 145/76)  BP(mean): --  RR: 17 (12 Nov 2024 08:30) (12 - 18)  SpO2: 91% (12 Nov 2024 08:30) (91% - 98%)    Parameters below as of 12 Nov 2024 08:30  Patient On (Oxygen Delivery Method): room air        GEN: NAD  R Knee: Dressing C/D/I.   B/L LE's: Motor intact + EHL/FHL/TA/GS in the BL LE. Sensation is grossly intact B/L. Extremities warm B/L. Compartments soft, compressible B/L, no calf tenderness B/L. DP 2+ B/L.    Labs:                          9.5    14.87 )-----------( 252      ( 12 Nov 2024 05:40 )             32.1       11-12    134[L]  |  100  |  18  ----------------------------<  143[H]  5.1   |  28  |  1.06    Ca    8.7      12 Nov 2024 05:40        A/P: Patient is a 74y y/o Female s/p R TKA, POD #1  -wound care, isometric exercises, GI motility, new medications, hospital course and discharge planning reviewed with pt  -Pain control/analgesia  -Inc spirometry reviewed and counseled  -SCD's to B/L LE  -PT/OT/WBAT  -Antibiotic 24hours post-op  -Anticoagulation: ASA BID starting POD#1  Discharge: HOME  Pt requires rolling walker to perform MRADLs at home.

## 2024-11-12 NOTE — DISCHARGE NOTE NURSING/CASE MANAGEMENT/SOCIAL WORK - NSDCFUADDAPPT_GEN_ALL_CORE_FT
Follow up with your surgeon in two weeks. Call for appointment.  If you need more pain medication, call your surgeon's office. For medication refills or authorizations, please call [phone]  We recommend that you call and schedule a follow up appointment within 2-4 weeks with your primary care physician for repeat blood work (CBC and BMP) for post hospital discharge follow-up care.  Call your surgeon if you have increased redness/pain/drainage or fever. Return to ER for shortness of breath/calf tenderness.

## 2024-11-12 NOTE — PATIENT PROFILE ADULT - INTERNATIONAL TRAVEL
Chief Complaint   Patient presents with    Joint Pain       1. Have you been to the ER, urgent care clinic since your last visit?  Hospitalized since your last visit?No    2. Have you seen or consulted any other health care providers outside of the [facility] since your last visit?  Include any pap smears or colon screening. No    
RHEUMATOLOGY PROBLEM LIST AND CHIEF COMPLAINT  1. Rheumatoid Arthritis - diagnosed 30+ years ago, treated to remission with Methotrexate (few years), positive RF, CCP, flare in 2019     Therapy History:   Prior DMARDs: Methotrexate (past response, put into remission), Plaquenil (1/2020-7/2021)  Current NSAIDs: Tylenol Arthritis  Current DMARDs: Leflunomide (4/2019-current, stopped briefly)      HISTORY OF PRESENT ILLNESS  This is a 74 y.o.  female.  Today, the patient complains of joint pain.  Location: right leg  Severity: 5 on a scale of 0-10  Timing: intermittent    Duration: 6 months  Context/Associated signs and symptoms: The patient continues on leflunomide 20 mg daily. She has been doing well overall and denies any persistent joint pain, joint swelling, morning stiffness. Her chief complaint today is pain in her right lateral thigh. She is unable to lay on her right side and will sometimes wake up in the middle of the night due to pain.      RHEUMATOLOGY REVIEW OF SYSTEMS   Positives as per history  Negatives as follows:  CONSTITUTlONAL:    Denies unexplained persistent fevers, weight change, chronic fatigue  HEAD/EYES:              Denies eye redness, blurry vision or sudden loss of vision, dry eyes, HA, temporal artery pain  ENT:                            Denies oral/nasal ulcers, recurrent sinus infections, dry mouth  RESPIRATORY:         No pleuritic pain, history of pleural effusions, hemoptysis, exertional dyspnea  CARDIOVASCULAR:             Denies chest pain, history of pericardial effusions  GASTRO:                    Denies heartburn, esophageal dysmotility, abdominal pain, nausea, vomiting, diarrhea, blood in the stool  HEMATOLOGIC:        No easy bruising, purpura, swollen lymph nodes  SKIN:                           Denies alopecia, ulcers, nodules, sun sensitivity, unexplained persistent rash   VASCULAR:                Denies edema, cyanosis, raynaud phenomenon  NEUROLOGIC:      
No

## 2024-11-12 NOTE — DISCHARGE NOTE NURSING/CASE MANAGEMENT/SOCIAL WORK - PATIENT PORTAL LINK FT
You can access the FollowMyHealth Patient Portal offered by [facility] by registering at the following website: [email or web address]. By joining [employer]’s FollowMyHealth portal, you will also be able to view your health information using other applications (apps) compatible with our system.

## 2024-11-12 NOTE — DISCHARGE NOTE NURSING/CASE MANAGEMENT/SOCIAL WORK - NSDCPEFALRISK_GEN_ALL_CORE
For information on Fall & Injury Prevention, visit: [email or web address] OR  [email or web address] OR  https://www.cdc.gov/steadi/patient.html

## 2024-11-18 ENCOUNTER — NON-APPOINTMENT (OUTPATIENT)
Age: 74
End: 2024-11-18

## 2024-11-18 DIAGNOSIS — Z98.41 CATARACT EXTRACTION STATUS, RIGHT EYE: ICD-10-CM

## 2024-11-18 DIAGNOSIS — M17.11 UNILATERAL PRIMARY OSTEOARTHRITIS, RIGHT KNEE: ICD-10-CM

## 2024-11-18 DIAGNOSIS — F41.9 ANXIETY DISORDER, UNSPECIFIED: ICD-10-CM

## 2024-11-18 DIAGNOSIS — Z90.710 ACQUIRED ABSENCE OF BOTH CERVIX AND UTERUS: ICD-10-CM

## 2024-11-18 DIAGNOSIS — K21.9 GASTRO-ESOPHAGEAL REFLUX DISEASE WITHOUT ESOPHAGITIS: ICD-10-CM

## 2024-11-18 DIAGNOSIS — Z94.7 CORNEAL TRANSPLANT STATUS: ICD-10-CM

## 2024-11-18 DIAGNOSIS — M25.761 OSTEOPHYTE, RIGHT KNEE: ICD-10-CM

## 2024-11-18 DIAGNOSIS — G43.909 MIGRAINE, UNSPECIFIED, NOT INTRACTABLE, WITHOUT STATUS MIGRAINOSUS: ICD-10-CM

## 2024-11-18 DIAGNOSIS — E78.00 PURE HYPERCHOLESTEROLEMIA, UNSPECIFIED: ICD-10-CM

## 2024-11-22 ENCOUNTER — APPOINTMENT (OUTPATIENT)
Dept: ORTHOPEDIC SURGERY | Facility: CLINIC | Age: 74
End: 2024-11-22
Payer: MEDICARE

## 2024-11-22 ENCOUNTER — RESULT CHARGE (OUTPATIENT)
Age: 74
End: 2024-11-22

## 2024-11-22 DIAGNOSIS — Z96.651 PRESENCE OF RIGHT ARTIFICIAL KNEE JOINT: ICD-10-CM

## 2024-11-22 PROBLEM — K21.9 GASTRO-ESOPHAGEAL REFLUX DISEASE WITHOUT ESOPHAGITIS: Chronic | Status: ACTIVE | Noted: 2024-11-01

## 2024-11-22 PROCEDURE — 73564 X-RAY EXAM KNEE 4 OR MORE: CPT | Mod: RT

## 2024-11-22 PROCEDURE — 99024 POSTOP FOLLOW-UP VISIT: CPT

## 2024-11-22 RX ORDER — OXYCODONE 5 MG/1
5 TABLET ORAL
Qty: 42 | Refills: 0 | Status: ACTIVE | COMMUNITY
Start: 2024-11-22 | End: 1900-01-01

## 2024-11-22 RX ORDER — OXYCODONE HYDROCHLORIDE 5 MG/1
5 CAPSULE ORAL
Qty: 42 | Refills: 0 | Status: ACTIVE | COMMUNITY
Start: 2024-11-22 | End: 1900-01-01

## 2024-12-16 ENCOUNTER — NON-APPOINTMENT (OUTPATIENT)
Age: 74
End: 2024-12-16

## 2024-12-20 ENCOUNTER — APPOINTMENT (OUTPATIENT)
Dept: ORTHOPEDIC SURGERY | Facility: CLINIC | Age: 74
End: 2024-12-20
Payer: MEDICARE

## 2024-12-20 DIAGNOSIS — Z96.651 PRESENCE OF RIGHT ARTIFICIAL KNEE JOINT: ICD-10-CM

## 2024-12-20 PROCEDURE — 99024 POSTOP FOLLOW-UP VISIT: CPT

## 2024-12-23 ENCOUNTER — APPOINTMENT (OUTPATIENT)
Dept: OPHTHALMOLOGY | Facility: CLINIC | Age: 74
End: 2024-12-23
Payer: MEDICARE

## 2024-12-23 ENCOUNTER — NON-APPOINTMENT (OUTPATIENT)
Age: 74
End: 2024-12-23

## 2024-12-23 PROCEDURE — 92012 INTRM OPH EXAM EST PATIENT: CPT

## 2025-01-08 NOTE — ASU PREOP CHECKLIST - WEIGHT IN LBS
Problem: Occupational Therapy - Adult  Goal: By Discharge: Performs self-care activities at highest level of function for planned discharge setting.  See evaluation for individualized goals.  Description: FUNCTIONAL STATUS PRIOR TO ADMISSION:  Patient was independent to mod I for ADLs and mod I for functional mobility using rolling walker. She recently moved into an apartment on the independent living side of the [place].    ,  ,  ,  ,  ,  ,  ,  ,  ,  ,       HOME SUPPORT: Patient lived alone. She reported her niece from [place] will come check on her.     Occupational Therapy Goals:  Initiated 1/8/2025  1.  Patient will perform grooming sitting unsupported EOB with Stand by Assist within 7 day(s).  2.  Patient will perform upper body dressing with Minimal Assist within 7 day(s).  3.  Patient will perform lower body dressing using AE PRN with Maximal Assist within 7 day(s).  4.  Patient will perform toilet transfers to drop arm [facility] with Maximal Assist  within 7 day(s).  5.  Patient will perform all aspects of toileting with Maximal Assist within 7 day(s).  6.  Patient will participate in upper extremity therapeutic exercise/activities with Stand by Assist for 5 minutes within 7 day(s).    Outcome: Progressing   OCCUPATIONAL THERAPY EVALUATION    Patient: Lizzy Araujo (87 y.o. female)  Date: 1/8/2025  Primary Diagnosis: Closed nondisplaced intertrochanteric fracture of right femur, initial encounter ([facility]) [S72.144A]  Noninfected skin tear of left lower extremity, initial encounter [S81.812A]  Other closed fracture of distal end of right femur, initial encounter ([facility]) [S72.061A]         Precautions: Fall Risk, Bed Alarm, Weight Bearing Right Lower Extremity Weight Bearing: Non Weight Bearing                ASSESSMENT :  The patient is limited by decreased functional mobility, independence in ADLs, high-level IADLs, ROM, strength, body mechanics, activity tolerance, endurance, safety awareness,  X2    Balance:      Balance  Sitting: Impaired  Sitting - Static: Poor (constant support);Fair (occasional)  Sitting - Dynamic: Not tested      ADL Assessment:          Feeding: Setup       Grooming: Moderate assistance  Grooming Skilled Clinical Factors: sitting EOB; required assist for balance and managing items    UE Bathing: Moderate assistance    LE Bathing: Dependent/Total       UE Dressing: Moderate assistance  UE Dressing Skilled Clinical Factors: was able to doff [facility] and don hospital gown sitting EOB w/assist    LE Dressing: Dependent/Total       Toileting: Dependent/Total    ADL Intervention and task modifications:      [facility] \"[facility]\"                                                       Daily Activity Inpatient Short Form  How much help from another person does the patient currently need... Total; A Lot A Little None   1.  Putting on and taking off regular lower body clothing? [x]  1 []  2 []  3 []  4   2.  Bathing (including washing, rinsing, drying)? []  1 [x]  2 []  3 []  4   3.  Toileting, which includes using toilet, bedpan or urinal? [x] 1 []  2 []  3 []  4   4.  Putting on and taking off regular upper body clothing? []  1 [x]  2 []  3 []  4   5.  Taking care of personal grooming such as brushing teeth? []  1 [x]  2 []  3 []  4   6.  Eating meals? []  1 []  2 []  3 [x]  4   © 2007, Trustees of [facility], under license to [employer]. All rights reserved     Score: 12/24     Interpretation of Tool:  Represents clinically-significant functional categories (i.e. Activities of daily living).    Cutoff score 39.4 (19) correlates to a good likelihood of discharging home versus a facility  Charmaine Loco, Jeanna Fowler, Ras Iglesias, Ernestina Bradley, Rod Purdy, Curtis Loco, -PAC “6-Clicks” Functional Assessment Scores Predict Acute Care Hospital Discharge Destination, Physical Therapy, Volume 94, Issue 9, 1 September 2014, Pages 0084-8378,  218

## 2025-01-25 ENCOUNTER — NON-APPOINTMENT (OUTPATIENT)
Age: 75
End: 2025-01-25

## 2025-02-21 ENCOUNTER — APPOINTMENT (OUTPATIENT)
Dept: ORTHOPEDIC SURGERY | Facility: CLINIC | Age: 75
End: 2025-02-21
Payer: MEDICARE

## 2025-02-21 ENCOUNTER — APPOINTMENT (OUTPATIENT)
Dept: ORTHOPEDIC SURGERY | Facility: CLINIC | Age: 75
End: 2025-02-21

## 2025-02-21 ENCOUNTER — RESULT CHARGE (OUTPATIENT)
Age: 75
End: 2025-02-21

## 2025-02-21 DIAGNOSIS — Z96.651 PRESENCE OF RIGHT ARTIFICIAL KNEE JOINT: ICD-10-CM

## 2025-02-21 DIAGNOSIS — M17.12 UNILATERAL PRIMARY OSTEOARTHRITIS, LEFT KNEE: ICD-10-CM

## 2025-02-21 PROCEDURE — 20610 DRAIN/INJ JOINT/BURSA W/O US: CPT | Mod: LT

## 2025-02-21 PROCEDURE — 99024 POSTOP FOLLOW-UP VISIT: CPT

## 2025-02-21 PROCEDURE — 99213 OFFICE O/P EST LOW 20 MIN: CPT | Mod: 25

## 2025-02-21 PROCEDURE — 73562 X-RAY EXAM OF KNEE 3: CPT | Mod: RT

## 2025-03-21 ENCOUNTER — NON-APPOINTMENT (OUTPATIENT)
Age: 75
End: 2025-03-21

## 2025-03-21 ENCOUNTER — APPOINTMENT (OUTPATIENT)
Dept: ORTHOPEDIC SURGERY | Facility: CLINIC | Age: 75
End: 2025-03-21

## 2025-03-21 DIAGNOSIS — Z96.651 PRESENCE OF RIGHT ARTIFICIAL KNEE JOINT: ICD-10-CM

## 2025-03-21 PROCEDURE — 99213 OFFICE O/P EST LOW 20 MIN: CPT

## 2025-03-21 PROCEDURE — 99214 OFFICE O/P EST MOD 30 MIN: CPT

## 2025-04-10 ENCOUNTER — NON-APPOINTMENT (OUTPATIENT)
Age: 75
End: 2025-04-10

## 2025-04-25 ENCOUNTER — APPOINTMENT (OUTPATIENT)
Dept: ORTHOPEDIC SURGERY | Facility: CLINIC | Age: 75
End: 2025-04-25

## 2025-05-24 ENCOUNTER — NON-APPOINTMENT (OUTPATIENT)
Age: 75
End: 2025-05-24

## 2025-06-30 ENCOUNTER — NON-APPOINTMENT (OUTPATIENT)
Age: 75
End: 2025-06-30

## 2025-06-30 ENCOUNTER — APPOINTMENT (OUTPATIENT)
Dept: OPHTHALMOLOGY | Facility: CLINIC | Age: 75
End: 2025-06-30
Payer: MEDICARE

## 2025-06-30 PROCEDURE — 92014 COMPRE OPH EXAM EST PT 1/>: CPT

## 2025-08-08 ENCOUNTER — APPOINTMENT (OUTPATIENT)
Dept: ORTHOPEDIC SURGERY | Facility: CLINIC | Age: 75
End: 2025-08-08
Payer: MEDICARE

## 2025-08-08 DIAGNOSIS — Z96.651 PRESENCE OF RIGHT ARTIFICIAL KNEE JOINT: ICD-10-CM

## 2025-08-08 PROCEDURE — 99213 OFFICE O/P EST LOW 20 MIN: CPT

## 2025-08-27 ENCOUNTER — APPOINTMENT (OUTPATIENT)
Dept: ORTHOPEDIC SURGERY | Facility: CLINIC | Age: 75
End: 2025-08-27
Payer: MEDICARE

## 2025-08-27 DIAGNOSIS — Z96.651 PRESENCE OF RIGHT ARTIFICIAL KNEE JOINT: ICD-10-CM

## 2025-08-27 PROCEDURE — 99214 OFFICE O/P EST MOD 30 MIN: CPT

## 2025-08-27 PROCEDURE — 73562 X-RAY EXAM OF KNEE 3: CPT | Mod: RT

## 2025-08-28 ENCOUNTER — APPOINTMENT (OUTPATIENT)
Dept: MRI IMAGING | Facility: CLINIC | Age: 75
End: 2025-08-28
Payer: MEDICARE

## 2025-08-28 PROCEDURE — 73721 MRI JNT OF LWR EXTRE W/O DYE: CPT | Mod: RT

## 2025-09-10 ENCOUNTER — APPOINTMENT (OUTPATIENT)
Dept: ORTHOPEDIC SURGERY | Facility: CLINIC | Age: 75
End: 2025-09-10
Payer: MEDICARE

## 2025-09-10 VITALS — WEIGHT: 192 LBS | BODY MASS INDEX: 34.02 KG/M2 | HEIGHT: 63 IN

## 2025-09-10 DIAGNOSIS — Z96.651 PRESENCE OF RIGHT ARTIFICIAL KNEE JOINT: ICD-10-CM

## 2025-09-10 PROCEDURE — 99214 OFFICE O/P EST MOD 30 MIN: CPT

## (undated) DEVICE — SYR ASEPTO

## (undated) DEVICE — DRSG DERMABOND PRINEO 22CM

## (undated) DEVICE — WOUND IRR IRRISEPT W 0.5 CHG

## (undated) DEVICE — MAKO DRAPE KIT

## (undated) DEVICE — SYR LUER LOK 20CC

## (undated) DEVICE — MAKO BLADE STANDARD

## (undated) DEVICE — HOOD T5 PEELAWAY

## (undated) DEVICE — POSITIONER FOAM BUMP FLAT TOP 10X6X4" LRG

## (undated) DEVICE — NDL HYPO SAFE 18G X 1.5" (PINK)

## (undated) DEVICE — SUT PDO 2 1/2 CIRCLE 40MM NDL 45CM

## (undated) DEVICE — DRSG ACE BANDAGE 6"

## (undated) DEVICE — CRYO/CUFF GRAVITY COOLER KNEE LARGE

## (undated) DEVICE — DRSG TELFA 3 X 8

## (undated) DEVICE — DRSG COBAN 6"

## (undated) DEVICE — MEDICATION LABELS W MARKER

## (undated) DEVICE — SUT STRATAFIX SPIRAL PDS PLUS 2-0 45CM CT-1

## (undated) DEVICE — ELCTR S&N WAND WEREWOLF FASTSEAL 6MM

## (undated) DEVICE — SOL IRR BAG NS 0.9% 3000ML

## (undated) DEVICE — FRA-ESU BOVIE FORCE TRIAD T6D04777DX: Type: DURABLE MEDICAL EQUIPMENT

## (undated) DEVICE — ZIMMER PULSAVAC PLUS FAN KIT

## (undated) DEVICE — PACK TOTAL KNEE

## (undated) DEVICE — MAKO VIZADISC KNEE TRACKING KIT

## (undated) DEVICE — STRYKER MIXEVAC 3 BONE CEMENT MIXER

## (undated) DEVICE — SUT VICRYL 0 36" CT-1 UNDYED

## (undated) DEVICE — ELCTR STRYKER NEPTUNE SMOKE EVACUATION PENCIL (GREEN)

## (undated) DEVICE — HOOD FLYTE STRYKER HELMET SHIELD

## (undated) DEVICE — SUT STRATAFIX SPIRAL MONOCRYL PLUS 4-0 45CM PS-2 UNDYED

## (undated) DEVICE — SAW BLADE STRYKER SAGITTAL EXTRA WIDE THIN SHORT

## (undated) DEVICE — DRSG 4 X 4" 4PLY STERILE